# Patient Record
Sex: FEMALE | Race: WHITE | URBAN - NONMETROPOLITAN AREA
[De-identification: names, ages, dates, MRNs, and addresses within clinical notes are randomized per-mention and may not be internally consistent; named-entity substitution may affect disease eponyms.]

---

## 2024-11-20 ENCOUNTER — HOSPITAL ENCOUNTER (OUTPATIENT)
Age: 34
Setting detail: SPECIMEN
Discharge: HOME | End: 2024-11-20
Payer: MEDICAID

## 2024-11-20 ENCOUNTER — INITIAL PRENATAL (OUTPATIENT)
Dept: OBGYN | Age: 34
End: 2024-11-20

## 2024-11-20 VITALS
HEIGHT: 67 IN | SYSTOLIC BLOOD PRESSURE: 104 MMHG | WEIGHT: 142.2 LBS | BODY MASS INDEX: 22.32 KG/M2 | DIASTOLIC BLOOD PRESSURE: 68 MMHG

## 2024-11-20 DIAGNOSIS — N91.2 AMENORRHEA: ICD-10-CM

## 2024-11-20 DIAGNOSIS — Z3A.17 17 WEEKS GESTATION OF PREGNANCY: Primary | ICD-10-CM

## 2024-11-20 DIAGNOSIS — Z32.01 POSITIVE URINE PREGNANCY TEST: ICD-10-CM

## 2024-11-20 DIAGNOSIS — Z34.82 ENCOUNTER FOR SUPERVISION OF OTHER NORMAL PREGNANCY IN SECOND TRIMESTER: ICD-10-CM

## 2024-11-20 LAB
ABO + RH BLD: NORMAL
BLOOD GROUP ANTIBODIES SERPL: NEGATIVE
CONTROL: NORMAL
HCV AB SERPL QL IA: NONREACTIVE
PREGNANCY TEST URINE, POC: POSITIVE

## 2024-11-20 PROCEDURE — 86762 RUBELLA ANTIBODY: CPT

## 2024-11-20 PROCEDURE — 85025 COMPLETE CBC W/AUTO DIFF WBC: CPT

## 2024-11-20 PROCEDURE — 87389 HIV-1 AG W/HIV-1&-2 AB AG IA: CPT

## 2024-11-20 PROCEDURE — 86403 PARTICLE AGGLUT ANTBDY SCRN: CPT

## 2024-11-20 PROCEDURE — 86780 TREPONEMA PALLIDUM: CPT

## 2024-11-20 PROCEDURE — 87491 CHLMYD TRACH DNA AMP PROBE: CPT

## 2024-11-20 PROCEDURE — 86901 BLOOD TYPING SEROLOGIC RH(D): CPT

## 2024-11-20 PROCEDURE — 87086 URINE CULTURE/COLONY COUNT: CPT

## 2024-11-20 PROCEDURE — 87340 HEPATITIS B SURFACE AG IA: CPT

## 2024-11-20 PROCEDURE — 86803 HEPATITIS C AB TEST: CPT

## 2024-11-20 PROCEDURE — 86900 BLOOD TYPING SEROLOGIC ABO: CPT

## 2024-11-20 PROCEDURE — 87591 N.GONORRHOEAE DNA AMP PROB: CPT

## 2024-11-20 PROCEDURE — 86850 RBC ANTIBODY SCREEN: CPT

## 2024-11-20 NOTE — PROGRESS NOTES
New OB Visit    Date of service: 2024    Cammie Goldberg  Is a 34 y.o.  female presenting for a New OB visit with Nurse. Name of Father of Baby is Shola Goldberg and is involved. Pt does work at self employed.    Pt is not Fertility pt..    PT's PCP is: Philip Baig MD     : 1990                                             Subjective:        No LMP recorded.   OB History   No obstetric history on file.           Social History     Tobacco Use   Smoking Status Never   Smokeless Tobacco Never        Social History     Substance and Sexual Activity   Alcohol Use Never        Allergies: Amoxicillin, Codeine, Corn-containing products, and Gluten meal    No past medical history on file.    No past surgical history on file.      Current Outpatient Medications:     lidocaine viscous (XYLOCAINE) 2 % solution, Take 15 mLs by mouth every 3 hours as needed for Irritation, Disp: 120 mL, Rfl: 0      Vital Signs There were no vitals taken for this visit.      No results found for this visit on 24.      Pain: none                            Nausea: yes      Vomiting: no        Breast enlargement or tenderness: yes    Frequency of urination:yes      Fatigue: yes         Patient history reviewed. Educational materials given and genetic testing reviewed.     OB Genetic Screening    Patient's Age 35+ at Date of Delivery      Cystic Fibrosis      Thalassemia MCV<80      Dionisio Chorea      Neural Tube Defect      Mental Retardation/Autism      Congenital Heart Defect      Was Person Treated for Fragilex?      Down Syndrome      Other Inherited Genetic Chromosomal Disorder?      Eyal-Sachs      Maternal Metabolic Disorder      Canavan Disease      Patient or Baby's Father Had Other Defects?      Familial dysautonomia      Recurrent Pregnancy Loss or Still Birth?      Sickle Cell Disease or Trait      Medications (including supplements, vitamins, herbs or OTC drugs) / illicit / recreational drugs / alcohol 
feces or litter boxes. Also, wash your hands after you handle raw meat, and fully cook all meat before you eat it. Wear gloves when you work in the yard or garden, and wash your hands well when you are done. Cat feces, raw or undercooked meat, and contaminated dirt can cause an infection that may harm your baby or lead to a miscarriage.     Avoid things that can make your body too hot and may be harmful to your baby, such as a hot tub or sauna. Or talk with your doctor before doing anything that raises your body temperature. Your doctor can tell you if it's safe.     Avoid chemical fumes, paint fumes, or poisons.     Do not use illegal drugs, marijuana, or alcohol.   Medicines    Review all of your medicines with your doctor. Some of your routine medicines may need to be changed to protect your baby.     Use acetaminophen (Tylenol) to relieve minor problems, such as a mild headache or backache or a mild fever with cold symptoms. Do not use nonsteroidal anti-inflammatory drugs (NSAIDs), such as ibuprofen (Advil, Motrin) or naproxen (Aleve), unless your doctor says it is okay.     Do not take two or more pain medicines at the same time unless the doctor told you to. Many pain medicines have acetaminophen, which is Tylenol. Too much acetaminophen (Tylenol) can be harmful.     Take your medicines exactly as prescribed. Call your doctor if you think you are having a problem with your medicine.   To manage morning sickness    Keep food in your stomach, but not too much at once. Try eating five or six small meals a day instead of three large meals.     For nausea when you wake up, eat a small snack, such as a couple of crackers or pretzels, before rising. Allow a few minutes for your stomach to settle before you slowly get up.     Try to avoid smells and foods that make you feel nauseated. High-fat or greasy foods, milk, and coffee may make nausea worse. Some foods that may be easier to tolerate include cold, spicy, sour,

## 2024-11-21 LAB
BASOPHILS # BLD: 0.03 K/UL (ref 0–0.2)
BASOPHILS NFR BLD: 0 % (ref 0–2)
CHLAMYDIA DNA UR QL NAA+PROBE: NEGATIVE
EOSINOPHIL # BLD: 0.12 K/UL (ref 0–0.44)
EOSINOPHILS RELATIVE PERCENT: 1 % (ref 1–4)
ERYTHROCYTE [DISTWIDTH] IN BLOOD BY AUTOMATED COUNT: 14 % (ref 11.8–14.4)
HBV SURFACE AG SERPL QL IA: NONREACTIVE
HCT VFR BLD AUTO: 34.4 % (ref 36.3–47.1)
HGB BLD-MCNC: 11.4 G/DL (ref 11.9–15.1)
HIV 1+2 AB+HIV1 P24 AG SERPL QL IA: NONREACTIVE
IMM GRANULOCYTES # BLD AUTO: 0.05 K/UL (ref 0–0.3)
IMM GRANULOCYTES NFR BLD: 1 %
LYMPHOCYTES NFR BLD: 1.32 K/UL (ref 1.1–3.7)
LYMPHOCYTES RELATIVE PERCENT: 13 % (ref 24–43)
MCH RBC QN AUTO: 30.6 PG (ref 25.2–33.5)
MCHC RBC AUTO-ENTMCNC: 33.1 G/DL (ref 28.4–34.8)
MCV RBC AUTO: 92.5 FL (ref 82.6–102.9)
MICROORGANISM SPEC CULT: ABNORMAL
MONOCYTES NFR BLD: 0.87 K/UL (ref 0.1–1.2)
MONOCYTES NFR BLD: 9 % (ref 3–12)
N GONORRHOEA DNA UR QL NAA+PROBE: NEGATIVE
NEUTROPHILS NFR BLD: 76 % (ref 36–65)
NEUTS SEG NFR BLD: 7.69 K/UL (ref 1.5–8.1)
NRBC BLD-RTO: 0 PER 100 WBC
PLATELET # BLD AUTO: 253 K/UL (ref 138–453)
PMV BLD AUTO: 10.1 FL (ref 8.1–13.5)
RBC # BLD AUTO: 3.72 M/UL (ref 3.95–5.11)
RUBV IGG SERPL QL IA: 32.2 IU/ML
SERVICE CMNT-IMP: ABNORMAL
SPECIMEN DESCRIPTION: ABNORMAL
SPECIMEN DESCRIPTION: NORMAL
T PALLIDUM AB SER QL IA: NONREACTIVE
WBC OTHER # BLD: 10.1 K/UL (ref 3.5–11.3)

## 2024-11-25 DIAGNOSIS — O23.42 GBS (GROUP B STREPTOCOCCUS) UTI COMPLICATING PREGNANCY, SECOND TRIMESTER: Primary | ICD-10-CM

## 2024-11-25 DIAGNOSIS — B95.1 GBS (GROUP B STREPTOCOCCUS) UTI COMPLICATING PREGNANCY, SECOND TRIMESTER: Primary | ICD-10-CM

## 2024-11-25 RX ORDER — NITROFURANTOIN 25; 75 MG/1; MG/1
100 CAPSULE ORAL 2 TIMES DAILY
Qty: 20 CAPSULE | Refills: 0 | Status: SHIPPED | OUTPATIENT
Start: 2024-11-25 | End: 2024-12-05

## 2024-11-25 RX ORDER — CEPHALEXIN 500 MG/1
500 CAPSULE ORAL 2 TIMES DAILY
Qty: 14 CAPSULE | Refills: 0 | Status: CANCELLED | OUTPATIENT
Start: 2024-11-25 | End: 2024-12-02

## 2024-11-25 NOTE — TELEPHONE ENCOUNTER
This is a GBS positive in urine on a pregnant patient can we see if they can run a culture and sensitivity

## 2024-12-10 SDOH — ECONOMIC STABILITY: FOOD INSECURITY: WITHIN THE PAST 12 MONTHS, THE FOOD YOU BOUGHT JUST DIDN'T LAST AND YOU DIDN'T HAVE MONEY TO GET MORE.: SOMETIMES TRUE

## 2024-12-10 SDOH — ECONOMIC STABILITY: FOOD INSECURITY: WITHIN THE PAST 12 MONTHS, YOU WORRIED THAT YOUR FOOD WOULD RUN OUT BEFORE YOU GOT MONEY TO BUY MORE.: SOMETIMES TRUE

## 2024-12-10 SDOH — ECONOMIC STABILITY: TRANSPORTATION INSECURITY
IN THE PAST 12 MONTHS, HAS LACK OF TRANSPORTATION KEPT YOU FROM MEETINGS, WORK, OR FROM GETTING THINGS NEEDED FOR DAILY LIVING?: NO

## 2024-12-10 SDOH — ECONOMIC STABILITY: INCOME INSECURITY: HOW HARD IS IT FOR YOU TO PAY FOR THE VERY BASICS LIKE FOOD, HOUSING, MEDICAL CARE, AND HEATING?: SOMEWHAT HARD

## 2024-12-11 ENCOUNTER — TELEPHONE (OUTPATIENT)
Dept: OBGYN | Age: 34
End: 2024-12-11

## 2024-12-11 ENCOUNTER — HOSPITAL ENCOUNTER (OUTPATIENT)
Age: 34
Setting detail: SPECIMEN
Discharge: HOME | End: 2024-12-11
Payer: MEDICAID

## 2024-12-11 ENCOUNTER — ROUTINE PRENATAL (OUTPATIENT)
Dept: OBGYN | Age: 34
End: 2024-12-11
Payer: MEDICAID

## 2024-12-11 VITALS
SYSTOLIC BLOOD PRESSURE: 114 MMHG | HEART RATE: 82 BPM | WEIGHT: 148 LBS | DIASTOLIC BLOOD PRESSURE: 62 MMHG | BODY MASS INDEX: 23.18 KG/M2

## 2024-12-11 DIAGNOSIS — Z12.4 SCREENING FOR MALIGNANT NEOPLASM OF CERVIX: ICD-10-CM

## 2024-12-11 DIAGNOSIS — N84.1 ENDOCERVICAL POLYP: ICD-10-CM

## 2024-12-11 DIAGNOSIS — Z34.82 ENCOUNTER FOR SUPERVISION OF OTHER NORMAL PREGNANCY IN SECOND TRIMESTER: Primary | ICD-10-CM

## 2024-12-11 DIAGNOSIS — O35.CXX1: ICD-10-CM

## 2024-12-11 DIAGNOSIS — Z3A.20 20 WEEKS GESTATION OF PREGNANCY: ICD-10-CM

## 2024-12-11 PROCEDURE — 99214 OFFICE O/P EST MOD 30 MIN: CPT | Performed by: ADVANCED PRACTICE MIDWIFE

## 2024-12-11 PROCEDURE — G0145 SCR C/V CYTO,THINLAYER,RESCR: HCPCS

## 2024-12-11 PROCEDURE — 87624 HPV HI-RISK TYP POOLED RSLT: CPT

## 2024-12-11 NOTE — TELEPHONE ENCOUNTER
Please follow referral to [facility] for fetal ultrasound with pulmonary edema and incomplete heart views

## 2024-12-12 ENCOUNTER — TELEPHONE (OUTPATIENT)
Dept: OBGYN | Age: 34
End: 2024-12-12

## 2024-12-12 DIAGNOSIS — O35.CXX1: Primary | ICD-10-CM

## 2024-12-12 NOTE — TELEPHONE ENCOUNTER
Pt LM that she has an appt at [facility] on 1/22/2025 for US and consult. Pt wants to know if we could refer her somewhere else that might be able to get her in sooner.

## 2024-12-12 NOTE — PROGRESS NOTES
Cammie Goldberg is here at 20w4d for:    Chief Complaint   Patient presents with    Routine Prenatal Visit     F/U in house 20 week u/s. TBE, patient has never had pap.        Estimated Due Date: Estimated Date of Delivery: 25    OB History    Para Term  AB Living   1             SAB IAB Ectopic Molar Multiple Live Births                    # Outcome Date GA Lbr Akhil/2nd Weight Sex Type Anes PTL Lv   1 Current                 History reviewed. No pertinent past medical history.    History reviewed. No pertinent surgical history.    Social History     Tobacco Use   Smoking Status Never   Smokeless Tobacco Never        Social History     Substance and Sexual Activity   Alcohol Use Never               HPI: here for routine ob visit with TBE and anatomy scan     PT denies fever, chills, nausea and vomiting       Vitals:  Estimated body mass index is 23.18 kg/m² as calculated from the following:    Height as of 24: 1.702 m (5' 7\").    Weight as of this encounter: 67.1 kg (148 lb).  BP: 114/62  Weight - Scale: 67.1 kg (148 lb)  Pulse: 82  Patient Position: Sitting  Fundal Height (cm): 20 cm  Fetal HR: 155  Movement: Present           Abdomen: round, soft, nontender  Total body exam completed please see prenatal physical exam tab in visit navigator       Results reviewed today:  20.3 WK IUP  CL:4.5 cm  HR:153 bpm  Anterior placenta, breech presentation  Ovaries: rt-not seen, lt-seen, wnl.  Gender: female  Active fetal movements  All visualized fetal anatomy WNL.  Heart views suboptimal d/t fetal position.   Possible area of fluid visualized in fetal chest.   No results found for this visit on 24.        ASSESSMENT & Plan    Diagnosis Orders   1. Encounter for supervision of other normal pregnancy in second trimester        2. 20 weeks gestation of pregnancy        3. Maternal care for other (suspected) fetal abnormality and damage, fetal pulmonary anomalies, fetus 1  Dino Vega,

## 2024-12-13 ENCOUNTER — TELEPHONE (OUTPATIENT)
Dept: OBGYN | Age: 34
End: 2024-12-13

## 2024-12-13 LAB
HPV I/H RISK 4 DNA CVX QL NAA+PROBE: NOT DETECTED
HPV SAMPLE: NORMAL
HPV, INTERPRETATION: NORMAL
HPV16 DNA CVX QL NAA+PROBE: NOT DETECTED
HPV18 DNA CVX QL NAA+PROBE: NOT DETECTED
SPECIMEN DESCRIPTION: NORMAL

## 2024-12-13 NOTE — TELEPHONE ENCOUNTER
Writer called [facility] this morning to confirm they had received Miss Goldberg's referral. Writer spoke with Angela, she stated they received it and would get her in next week. About 10 am Friday 12.13.2024, NATTY called back and stated they do not except her insurance, so they will not be able to see her.     Please advise

## 2024-12-16 DIAGNOSIS — O35.CXX1: Primary | ICD-10-CM

## 2024-12-17 NOTE — TELEPHONE ENCOUNTER
Attempted to fax multiple times, back with communication errors. Faxed to an alternate fax number. Awaiting response,

## 2024-12-18 NOTE — TELEPHONE ENCOUNTER
Faxed referral, with fax confirmation received. Called pt and pt stated she has an appt on12/19/2024

## 2024-12-20 LAB — CYTOLOGY REPORT: NORMAL

## 2025-01-06 ENCOUNTER — ROUTINE PRENATAL (OUTPATIENT)
Dept: OBGYN | Age: 35
End: 2025-01-06
Payer: MEDICAID

## 2025-01-06 VITALS
BODY MASS INDEX: 24.12 KG/M2 | SYSTOLIC BLOOD PRESSURE: 116 MMHG | HEART RATE: 60 BPM | DIASTOLIC BLOOD PRESSURE: 60 MMHG | WEIGHT: 154 LBS

## 2025-01-06 DIAGNOSIS — Z34.02 ENCOUNTER FOR SUPERVISION OF NORMAL FIRST PREGNANCY IN SECOND TRIMESTER: Primary | ICD-10-CM

## 2025-01-06 DIAGNOSIS — Z3A.24 24 WEEKS GESTATION OF PREGNANCY: ICD-10-CM

## 2025-01-06 PROCEDURE — 99213 OFFICE O/P EST LOW 20 MIN: CPT | Performed by: ADVANCED PRACTICE MIDWIFE

## 2025-01-06 NOTE — PROGRESS NOTES
Cammie Goldberg is here at 24w2d for:    Chief Complaint   Patient presents with    Routine Prenatal Visit     Instructions for 1 hour GTT. At 28 weeks.  C/O heartburn, using elena.        Estimated Due Date: Estimated Date of Delivery: 25    OB History    Para Term  AB Living   1             SAB IAB Ectopic Molar Multiple Live Births                    # Outcome Date GA Lbr Akhil/2nd Weight Sex Type Anes PTL Lv   1 Current                 No past medical history on file.    No past surgical history on file.    Social History     Tobacco Use   Smoking Status Never   Smokeless Tobacco Never        Social History     Substance and Sexual Activity   Alcohol Use Never               HPI: here for routine ob visit, is gluten and corn intolerant, can't take glucola     PT denies fever, chills, nausea and vomiting       Vitals:  Estimated body mass index is 24.12 kg/m² as calculated from the following:    Height as of 24: 1.702 m (5' 7\").    Weight as of this encounter: 69.9 kg (154 lb).  BP: 116/60  Weight - Scale: 69.9 kg (154 lb)  Pulse: 60  Patient Position: Sitting  Fundal Height (cm): 25 cm  Fetal HR: 136  Movement: Present           Abdomen: gravid,soft, nontender    Results reviewed today:    No results found for this visit on 25.        ASSESSMENT & Plan    Diagnosis Orders   1. Encounter for supervision of normal first pregnancy in second trimester        2. 24 weeks gestation of pregnancy            see mfm reports with no fluid noted in their sono around pleural cavity or pericardiem      I am having Cammie Goldberg maintain her Multiple Vitamins-Minerals (MULTIVITAMIN ADULTS PO) and Prenatal MV-Min-Fe Fum-FA-DHA (PRENATAL 1 PO).    Return in about 4 weeks (around 2/3/2025) for ob, hgb, hgb a1c and fasting glucose.    There are no Patient Instructions on file for this visit.             EVAN Raines - SUSAN,2025 11:38 AM                non-verbal

## 2025-02-03 ENCOUNTER — HOSPITAL ENCOUNTER (OUTPATIENT)
Age: 35
Setting detail: SPECIMEN
Discharge: HOME | End: 2025-02-03
Payer: MEDICAID

## 2025-02-03 ENCOUNTER — ROUTINE PRENATAL (OUTPATIENT)
Dept: OBGYN | Age: 35
End: 2025-02-03
Payer: MEDICAID

## 2025-02-03 VITALS
BODY MASS INDEX: 25.69 KG/M2 | HEART RATE: 79 BPM | WEIGHT: 164 LBS | SYSTOLIC BLOOD PRESSURE: 112 MMHG | DIASTOLIC BLOOD PRESSURE: 68 MMHG

## 2025-02-03 DIAGNOSIS — R30.0 DYSURIA: ICD-10-CM

## 2025-02-03 DIAGNOSIS — Z3A.28 28 WEEKS GESTATION OF PREGNANCY: Primary | ICD-10-CM

## 2025-02-03 DIAGNOSIS — Z3A.28 28 WEEKS GESTATION OF PREGNANCY: ICD-10-CM

## 2025-02-03 DIAGNOSIS — Z34.03 ENCOUNTER FOR SUPERVISION OF NORMAL FIRST PREGNANCY IN THIRD TRIMESTER: ICD-10-CM

## 2025-02-03 LAB
EST. AVERAGE GLUCOSE BLD GHB EST-MCNC: 94 MG/DL
GLUCOSE P FAST SERPL-MCNC: 75 MG/DL (ref 74–99)
HBA1C MFR BLD: 4.9 % (ref 4–6)
HGB BLD-MCNC: 11.1 G/DL (ref 11.9–15.1)

## 2025-02-03 PROCEDURE — 99213 OFFICE O/P EST LOW 20 MIN: CPT | Performed by: ADVANCED PRACTICE MIDWIFE

## 2025-02-03 PROCEDURE — 82947 ASSAY GLUCOSE BLOOD QUANT: CPT

## 2025-02-03 PROCEDURE — 87086 URINE CULTURE/COLONY COUNT: CPT

## 2025-02-03 PROCEDURE — 85018 HEMOGLOBIN: CPT

## 2025-02-03 PROCEDURE — 36415 COLL VENOUS BLD VENIPUNCTURE: CPT | Performed by: ADVANCED PRACTICE MIDWIFE

## 2025-02-03 PROCEDURE — 87186 SC STD MICRODIL/AGAR DIL: CPT

## 2025-02-03 PROCEDURE — 83036 HEMOGLOBIN GLYCOSYLATED A1C: CPT

## 2025-02-03 PROCEDURE — 86403 PARTICLE AGGLUT ANTBDY SCRN: CPT

## 2025-02-03 SDOH — ECONOMIC STABILITY: FOOD INSECURITY: WITHIN THE PAST 12 MONTHS, THE FOOD YOU BOUGHT JUST DIDN'T LAST AND YOU DIDN'T HAVE MONEY TO GET MORE.: SOMETIMES TRUE

## 2025-02-03 SDOH — ECONOMIC STABILITY: INCOME INSECURITY: IN THE LAST 12 MONTHS, WAS THERE A TIME WHEN YOU WERE NOT ABLE TO PAY THE MORTGAGE OR RENT ON TIME?: NO

## 2025-02-03 SDOH — ECONOMIC STABILITY: FOOD INSECURITY: WITHIN THE PAST 12 MONTHS, YOU WORRIED THAT YOUR FOOD WOULD RUN OUT BEFORE YOU GOT MONEY TO BUY MORE.: SOMETIMES TRUE

## 2025-02-03 SDOH — ECONOMIC STABILITY: TRANSPORTATION INSECURITY
IN THE PAST 12 MONTHS, HAS THE LACK OF TRANSPORTATION KEPT YOU FROM MEDICAL APPOINTMENTS OR FROM GETTING MEDICATIONS?: NO

## 2025-02-03 NOTE — PROGRESS NOTES
Cammie Goldberg is here at 28w2d for:    Chief Complaint   Patient presents with    Routine Prenatal Visit     Feeling good. Hemoglobin, HCG A1C and fasting glucose drawn.        Estimated Due Date: Estimated Date of Delivery: 25    OB History    Para Term  AB Living   1             SAB IAB Ectopic Molar Multiple Live Births                    # Outcome Date GA Lbr Akhil/2nd Weight Sex Type Anes PTL Lv   1 Current                 No past medical history on file.    No past surgical history on file.    Social History     Tobacco Use   Smoking Status Never   Smokeless Tobacco Never        Social History     Substance and Sexual Activity   Alcohol Use Never               HPI: here for routine ob visit at 28 weeks;  patient has IBS/ corn intolerant; cannot drink glucola;      PT denies fever, chills, nausea and vomiting       Vitals:  Estimated body mass index is 25.69 kg/m² as calculated from the following:    Height as of 24: 1.702 m (5' 7\").    Weight as of this encounter: 74.4 kg (164 lb).  BP: 112/68  Weight - Scale: 74.4 kg (164 lb)  Pulse: 79  Patient Position: Sitting  Albumin: Negative  Glucose: Negative  Fundal Height (cm): 28 cm  Fetal HR: 145  Movement: Present           Abdomen: gravid,soft, nontender    Results reviewed today:    No results found for this visit on 25.        ASSESSMENT & Plan    Diagnosis Orders   1. 28 weeks gestation of pregnancy  Glucose, Fasting    Hemoglobin A1C    Hemoglobin      2. Encounter for supervision of normal first pregnancy in third trimester        3. Dysuria  Culture, Urine                I am having Cammie Goldberg maintain her Prenatal MV-Min-Fe Fum-FA-DHA (PRENATAL 1 PO).    Return in about 2 weeks (around 2025) for ob 30wkUS+tdap.    There are no Patient Instructions on file for this visit.             Anamaria Trinidad, EVAN - SUSAN,2/3/2025 10:32 AM

## 2025-02-05 LAB
MICROORGANISM SPEC CULT: ABNORMAL
SERVICE CMNT-IMP: ABNORMAL
SPECIMEN DESCRIPTION: ABNORMAL

## 2025-02-05 RX ORDER — CEPHALEXIN 500 MG/1
500 CAPSULE ORAL 3 TIMES DAILY
Qty: 21 CAPSULE | Refills: 0 | Status: SHIPPED | OUTPATIENT
Start: 2025-02-05 | End: 2025-02-12

## 2025-02-05 NOTE — RESULT ENCOUNTER NOTE
Pt. notified of results and voices understanding. Patient states she has never taken Keflex in  the past.

## 2025-02-16 SDOH — ECONOMIC STABILITY: INCOME INSECURITY: IN THE LAST 12 MONTHS, WAS THERE A TIME WHEN YOU WERE NOT ABLE TO PAY THE MORTGAGE OR RENT ON TIME?: NO

## 2025-02-16 SDOH — ECONOMIC STABILITY: FOOD INSECURITY: WITHIN THE PAST 12 MONTHS, THE FOOD YOU BOUGHT JUST DIDN'T LAST AND YOU DIDN'T HAVE MONEY TO GET MORE.: SOMETIMES TRUE

## 2025-02-16 SDOH — ECONOMIC STABILITY: FOOD INSECURITY: WITHIN THE PAST 12 MONTHS, YOU WORRIED THAT YOUR FOOD WOULD RUN OUT BEFORE YOU GOT MONEY TO BUY MORE.: SOMETIMES TRUE

## 2025-02-17 ENCOUNTER — ROUTINE PRENATAL (OUTPATIENT)
Dept: OBGYN | Age: 35
End: 2025-02-17
Payer: MEDICAID

## 2025-02-17 VITALS — BODY MASS INDEX: 26.03 KG/M2 | SYSTOLIC BLOOD PRESSURE: 104 MMHG | WEIGHT: 166.2 LBS | DIASTOLIC BLOOD PRESSURE: 62 MMHG

## 2025-02-17 DIAGNOSIS — Z3A.30 30 WEEKS GESTATION OF PREGNANCY: ICD-10-CM

## 2025-02-17 DIAGNOSIS — O09.513 AMA (ADVANCED MATERNAL AGE) PRIMIGRAVIDA 35+, THIRD TRIMESTER: ICD-10-CM

## 2025-02-17 DIAGNOSIS — Z34.03 ENCOUNTER FOR SUPERVISION OF NORMAL FIRST PREGNANCY IN THIRD TRIMESTER: Primary | ICD-10-CM

## 2025-02-17 PROCEDURE — 99213 OFFICE O/P EST LOW 20 MIN: CPT | Performed by: ADVANCED PRACTICE MIDWIFE

## 2025-02-17 NOTE — PROGRESS NOTES
Cammiedick Goldberg is here at 30w2d for:    Chief Complaint   Patient presents with    Routine Prenatal Visit     Follow up in house usn. Declines Tdap. Voices no concerns. Intermittent shortness of breath       Estimated Due Date: Estimated Date of Delivery: 25    OB History    Para Term  AB Living   1             SAB IAB Ectopic Molar Multiple Live Births                    # Outcome Date GA Lbr Akhil/2nd Weight Sex Type Anes PTL Lv   1 Current                 No past medical history on file.    No past surgical history on file.    Social History     Tobacco Use   Smoking Status Never   Smokeless Tobacco Never        Social History     Substance and Sexual Activity   Alcohol Use Never               HPI: here for routine ob visit, c/o intermittant shortness of breast     PT denies fever, chills, nausea and vomiting     Denies cough or wheezing    Vitals:  Estimated body mass index is 26.03 kg/m² as calculated from the following:    Height as of 24: 1.702 m (5' 7\").    Weight as of this encounter: 75.4 kg (166 lb 3.2 oz).  BP: 104/62  Weight - Scale: 75.4 kg (166 lb 3.2 oz)  Patient Position: Sitting  Albumin: Negative  Glucose: Negative  Fundal Height (cm): 30 cm  Fetal HR: 141  Movement: Present  Presentation: Vertex           Abdomen: gravid, soft, nontender    Results reviewed today:  30.1 WK IUP  EFW:47.9% (3lb 6oz)  CL:3.6 cm  NERY:11.4 cm  HR:161 bpm  Anterior placenta, cephalic presentation  Active fetal movements.  Marginal cord insertion.  No results found for this visit on 25.        ASSESSMENT & Plan   Assessment & Plan  Encounter for supervision of normal first pregnancy in third trimester   Monitored by specialist- no acute findings meriting change in the plan         30 weeks gestation of pregnancy   Monitored by specialist- no acute findings meriting change in the plan         AMA (advanced maternal age) primigravida 35+, third trimester   Chronic, at goal (stable),

## 2025-03-05 ENCOUNTER — ROUTINE PRENATAL (OUTPATIENT)
Dept: OBGYN | Age: 35
End: 2025-03-05
Payer: MEDICAID

## 2025-03-05 VITALS
BODY MASS INDEX: 26.59 KG/M2 | DIASTOLIC BLOOD PRESSURE: 62 MMHG | HEART RATE: 84 BPM | SYSTOLIC BLOOD PRESSURE: 116 MMHG | WEIGHT: 169.8 LBS

## 2025-03-05 DIAGNOSIS — Z34.03 ENCOUNTER FOR SUPERVISION OF NORMAL FIRST PREGNANCY IN THIRD TRIMESTER: Primary | ICD-10-CM

## 2025-03-05 DIAGNOSIS — Z3A.32 32 WEEKS GESTATION OF PREGNANCY: ICD-10-CM

## 2025-03-05 PROCEDURE — 99213 OFFICE O/P EST LOW 20 MIN: CPT | Performed by: ADVANCED PRACTICE MIDWIFE

## 2025-03-05 NOTE — PROGRESS NOTES
Cammie Goldberg is here at 32w4d for:    Chief Complaint   Patient presents with    Routine Prenatal Visit     C/O low back pain.        Estimated Due Date: Estimated Date of Delivery: 25    OB History    Para Term  AB Living   1             SAB IAB Ectopic Molar Multiple Live Births                    # Outcome Date GA Lbr Akhil/2nd Weight Sex Type Anes PTL Lv   1 Current                 No past medical history on file.    No past surgical history on file.    Social History     Tobacco Use   Smoking Status Never   Smokeless Tobacco Never        Social History     Substance and Sexual Activity   Alcohol Use Never               HPI: here for routine ob visit, c/o fatigue low back and pelvic \"soreness\"     PT denies fever, chills, nausea and vomiting       Vitals:  Estimated body mass index is 26.59 kg/m² as calculated from the following:    Height as of 24: 1.702 m (5' 7\").    Weight as of this encounter: 77 kg (169 lb 12.8 oz).  BP: 116/62  Weight - Scale: 77 kg (169 lb 12.8 oz)  Pulse: 84  Patient Position: Sitting  Albumin: Negative  Glucose: Negative  Movement: Present           Abdomen: gravid,soft, nontender    Results reviewed today:    No results found for this visit on 25.        ASSESSMENT & Plan   Assessment & Plan  Encounter for supervision of normal first pregnancy in third trimester   Chronic, at goal (stable), continue current treatment plan         32 weeks gestation of pregnancy   Chronic, at goal (stable), continue current treatment plan         Will trial prenatal cradle / minicradle        I am having Cammie Goldberg maintain her Prenatal MV-Min-Fe Fum-FA-DHA (PRENATAL 1 PO).    Return in about 2 weeks (around 3/19/2025) for ob.    There are no Patient Instructions on file for this visit.             EVAN Raines - SUSAN,3/5/2025 3:10 PM

## 2025-03-17 ENCOUNTER — HOSPITAL ENCOUNTER (OUTPATIENT)
Age: 35
Setting detail: SPECIMEN
Discharge: HOME | End: 2025-03-17
Payer: MEDICAID

## 2025-03-17 ENCOUNTER — ROUTINE PRENATAL (OUTPATIENT)
Dept: OBGYN | Age: 35
End: 2025-03-17
Payer: MEDICAID

## 2025-03-17 VITALS
BODY MASS INDEX: 27.16 KG/M2 | SYSTOLIC BLOOD PRESSURE: 112 MMHG | WEIGHT: 173.4 LBS | DIASTOLIC BLOOD PRESSURE: 68 MMHG | HEART RATE: 85 BPM

## 2025-03-17 DIAGNOSIS — Z3A.34 34 WEEKS GESTATION OF PREGNANCY: ICD-10-CM

## 2025-03-17 DIAGNOSIS — Z34.03 ENCOUNTER FOR SUPERVISION OF NORMAL FIRST PREGNANCY IN THIRD TRIMESTER: Primary | ICD-10-CM

## 2025-03-17 DIAGNOSIS — N93.9 VAGINAL SPOTTING: ICD-10-CM

## 2025-03-17 LAB
BACTERIA URNS QL MICRO: ABNORMAL
BILIRUB UR QL STRIP: NEGATIVE
CLARITY UR: CLEAR
COLOR UR: YELLOW
EPI CELLS #/AREA URNS HPF: ABNORMAL /HPF (ref 0–25)
GLUCOSE UR STRIP-MCNC: NEGATIVE MG/DL
HGB UR QL STRIP.AUTO: ABNORMAL
KETONES UR STRIP-MCNC: NEGATIVE MG/DL
LEUKOCYTE ESTERASE UR QL STRIP: ABNORMAL
MUCOUS THREADS URNS QL MICRO: ABNORMAL
NITRITE UR QL STRIP: NEGATIVE
PH UR STRIP: 6.5 [PH] (ref 5–9)
PROT UR STRIP-MCNC: NEGATIVE MG/DL
RBC #/AREA URNS HPF: ABNORMAL /HPF (ref 0–2)
SP GR UR STRIP: 1.02 (ref 1.01–1.02)
UROBILINOGEN UR STRIP-ACNC: NORMAL EU/DL (ref 0–1)
WBC #/AREA URNS HPF: ABNORMAL /HPF (ref 0–5)

## 2025-03-17 PROCEDURE — 99213 OFFICE O/P EST LOW 20 MIN: CPT | Performed by: ADVANCED PRACTICE MIDWIFE

## 2025-03-17 PROCEDURE — 87086 URINE CULTURE/COLONY COUNT: CPT

## 2025-03-17 PROCEDURE — 81001 URINALYSIS AUTO W/SCOPE: CPT

## 2025-03-17 PROCEDURE — 86403 PARTICLE AGGLUT ANTBDY SCRN: CPT

## 2025-03-17 NOTE — PROGRESS NOTES
New, not at goal (unstable), continue current plan pending work up below    Orders:    Urinalysis; Future    Culture, Urine; Future            I am having Cammie Goldberg maintain her Prenatal MV-Min-Fe Fum-FA-DHA (PRENATAL 1 PO).    Return in about 2 weeks (around 3/31/2025) for ob no gbs.    There are no Patient Instructions on file for this visit.             EVAN Raines CNM,3/17/2025 2:45 PM

## 2025-03-18 LAB
MICROORGANISM SPEC CULT: ABNORMAL
SERVICE CMNT-IMP: ABNORMAL
SPECIMEN DESCRIPTION: ABNORMAL

## 2025-03-19 ENCOUNTER — RESULTS FOLLOW-UP (OUTPATIENT)
Dept: OBGYN | Age: 35
End: 2025-03-19

## 2025-03-19 RX ORDER — CEPHALEXIN 500 MG/1
500 CAPSULE ORAL 2 TIMES DAILY
Qty: 14 CAPSULE | Refills: 0 | Status: SHIPPED | OUTPATIENT
Start: 2025-03-19 | End: 2025-03-26

## 2025-03-19 NOTE — RESULT ENCOUNTER NOTE
Pt. notified of results and voices understanding. Patient has a lot of \"weird dreams\" with antibiotics. Has previously tolerated Keflex but only twice a day due to the Weird Dreams\".

## 2025-03-27 ENCOUNTER — TELEPHONE (OUTPATIENT)
Dept: OBGYN | Age: 35
End: 2025-03-27

## 2025-03-27 ENCOUNTER — HOSPITAL ENCOUNTER (OUTPATIENT)
Age: 35
Discharge: HOME | End: 2025-03-27
Attending: OBSTETRICS & GYNECOLOGY | Admitting: OBSTETRICS & GYNECOLOGY
Payer: MEDICAID

## 2025-03-27 VITALS
RESPIRATION RATE: 16 BRPM | BODY MASS INDEX: 25.62 KG/M2 | TEMPERATURE: 97.9 F | SYSTOLIC BLOOD PRESSURE: 101 MMHG | HEIGHT: 69 IN | DIASTOLIC BLOOD PRESSURE: 68 MMHG | WEIGHT: 173 LBS | HEART RATE: 84 BPM

## 2025-03-27 PROBLEM — O36.8190 DECREASED FETAL MOVEMENT AFFECTING MANAGEMENT OF MOTHER, ANTEPARTUM: Status: ACTIVE | Noted: 2025-03-27

## 2025-03-27 LAB
BACTERIA URNS QL MICRO: ABNORMAL
BILIRUB UR QL STRIP: NEGATIVE
CLARITY UR: CLEAR
COLOR UR: YELLOW
EPI CELLS #/AREA URNS HPF: ABNORMAL /HPF (ref 0–25)
GLUCOSE UR STRIP-MCNC: NEGATIVE MG/DL
HGB UR QL STRIP.AUTO: NEGATIVE
KETONES UR STRIP-MCNC: NEGATIVE MG/DL
LEUKOCYTE ESTERASE UR QL STRIP: ABNORMAL
MUCOUS THREADS URNS QL MICRO: ABNORMAL
NITRITE UR QL STRIP: NEGATIVE
PH UR STRIP: 6.5 [PH] (ref 5–9)
PROT UR STRIP-MCNC: ABNORMAL MG/DL
RBC #/AREA URNS HPF: ABNORMAL /HPF (ref 0–2)
SP GR UR STRIP: 1.02 (ref 1.01–1.02)
UROBILINOGEN UR STRIP-ACNC: NORMAL EU/DL (ref 0–1)
WBC #/AREA URNS HPF: ABNORMAL /HPF (ref 0–5)

## 2025-03-27 PROCEDURE — 99212 OFFICE O/P EST SF 10 MIN: CPT

## 2025-03-27 PROCEDURE — 87086 URINE CULTURE/COLONY COUNT: CPT

## 2025-03-27 PROCEDURE — 86403 PARTICLE AGGLUT ANTBDY SCRN: CPT

## 2025-03-27 PROCEDURE — 81001 URINALYSIS AUTO W/SCOPE: CPT

## 2025-03-27 PROCEDURE — 59025 FETAL NON-STRESS TEST: CPT

## 2025-03-27 NOTE — DISCHARGE INSTRUCTIONS
OUTPATIENT DISCHARGE  Dr. Carla Kahn [facility]  Dr. Merary Trinidad [facility]  [address]  [address] [phone]   or [employer] [phone]     ACTIVITY LIMITATIONS:  ( x )Up and about as desired and tolerated  (  )Up to bathroom only  (  )Lay on either side  (  )Avoid heavy lifting or exercise  (  )No sex  (  )No nipple stimulation  (  )Complet bedrest  (  )Avoid using stairs  ( x )Increase fluids  **DRINK AT LEAST eight-8oz. Glasses of water daily.**    Call your Doctor if:  ( x )Contractions are every 5 minutes apart (from start of one to the start of the next contraction) lasting 60 seconds for at least 1 hour, strong enough you can not walk or talk through the contraction and regular.  ( x )Bag of water breaks  ( x )Vaginal bleeding  ( x )Unusual pain occurs  ( x )Decreased fetal movement  (  ) labor:  If you have 4 contractions in an hour    Keep your scheduled follow up appointment.  Or call for a follow up on________________________________________________.    IN CASE OF EMERGENCY CONTACT LABOR AND DELIVERY [phone].

## 2025-03-27 NOTE — PROGRESS NOTES
Cammie Goldberg is here in L&D at 35w5d for:decreased fetal movement      Maternal Vitals  Temp: 97.9 °F (36.6 °C)  Temp Source: Oral  BP: 101/68  Pulse: 84  Respirations: 16         Findings  Baseline: 145 BPM  Baseline Classification: Normal  Variability: Moderate  Decelerations: None  Accelerations: Yes  Acoustic Stimulator: No  Fetal Movement: Yes  Multiple Gestation?: No  Uterine contractions/10 min.: 1  Interpretation: Reactive  Interpreted by: ARTURO Sharma RN, YAMILKA Branham RN             NST Time start:  1220    NST Time stop:   1240      NST is reactive. Quality of tracing is satisfactory.

## 2025-03-27 NOTE — PROGRESS NOTES
Patient arrives ambulatory to unit at this time with SO for c/o decreased fetal movement. Pt states she has felt fetal movement but it is decreased form fetus normal. Pt has noticed this the last couple days stating \"she normally goes crazy after I eat breakfast and she hasn't been the past few days.\" Pt instructed to change into a gown and provide urine sample.

## 2025-03-27 NOTE — TELEPHONE ENCOUNTER
Patient calls to report  she has had decreased fetal movement since she started Keflex on 03/19/2025. Patient was being treated for UTI however stopped antibiotic as she had \"werid dreams\" dizzy and fatigue. Patient advised to proceed to L&D for further evaluation, patient voiced understanding.

## 2025-03-27 NOTE — PROGRESS NOTES
Patient placed on the monitor at this time, and instructed on NST. Admission questions asked at this time as well.

## 2025-03-27 NOTE — FLOWSHEET NOTE
RN phones Dr Aguirre at this time with reactive NST following pt concerns of decreased FM and urinary frequency and UA results. Orders to send urine for culture and discharge pt at this time.

## 2025-03-28 LAB
MICROORGANISM SPEC CULT: ABNORMAL
SERVICE CMNT-IMP: ABNORMAL
SPECIMEN DESCRIPTION: ABNORMAL

## 2025-03-29 ENCOUNTER — RESULTS FOLLOW-UP (OUTPATIENT)
Dept: OBGYN | Age: 35
End: 2025-03-29

## 2025-04-01 ENCOUNTER — ROUTINE PRENATAL (OUTPATIENT)
Dept: OBGYN | Age: 35
End: 2025-04-01
Payer: MEDICAID

## 2025-04-01 VITALS
DIASTOLIC BLOOD PRESSURE: 74 MMHG | WEIGHT: 175 LBS | HEART RATE: 85 BPM | BODY MASS INDEX: 25.84 KG/M2 | SYSTOLIC BLOOD PRESSURE: 116 MMHG

## 2025-04-01 DIAGNOSIS — Z3A.36 36 WEEKS GESTATION OF PREGNANCY: Primary | ICD-10-CM

## 2025-04-01 DIAGNOSIS — Z34.03 ENCOUNTER FOR SUPERVISION OF NORMAL FIRST PREGNANCY IN THIRD TRIMESTER: ICD-10-CM

## 2025-04-01 PROCEDURE — 99213 OFFICE O/P EST LOW 20 MIN: CPT | Performed by: ADVANCED PRACTICE MIDWIFE

## 2025-04-01 ASSESSMENT — PATIENT HEALTH QUESTIONNAIRE - PHQ9
SUM OF ALL RESPONSES TO PHQ QUESTIONS 1-9: 0
1. LITTLE INTEREST OR PLEASURE IN DOING THINGS: NOT AT ALL
SUM OF ALL RESPONSES TO PHQ QUESTIONS 1-9: 0
2. FEELING DOWN, DEPRESSED OR HOPELESS: NOT AT ALL

## 2025-04-01 NOTE — PROGRESS NOTES
Cammie Goldberg is here at 36w3d for:    Chief Complaint   Patient presents with    Routine Prenatal Visit     36 wk ob. Pt states she was in labor and delivery last week for decrease fetal movement.        Estimated Due Date: Estimated Date of Delivery: 25    OB History    Para Term  AB Living   1        SAB IAB Ectopic Molar Multiple Live Births              # Outcome Date GA Lbr Akhil/2nd Weight Sex Type Anes PTL Lv   1 Current                 History reviewed. No pertinent past medical history.    History reviewed. No pertinent surgical history.    Social History     Tobacco Use   Smoking Status Never   Smokeless Tobacco Never        Social History     Substance and Sexual Activity   Alcohol Use Never               HPI: here for routine ob visit, intermittant contractions reported, does feel baby each day but \"not as big of movements\"     PT denies fever, chills, nausea and vomiting       Vitals:  Estimated body mass index is 25.84 kg/m² as calculated from the following:    Height as of 3/27/25: 1.753 m (5' 9\").    Weight as of this encounter: 79.4 kg (175 lb).  BP: 116/74  Weight - Scale: 79.4 kg (175 lb)  Pulse: 85  Patient Position: Sitting  Albumin: Negative  Glucose: Negative  Fundal Height (cm): 37 cm  Fetal HR: 145  Movement: Present  Presentation: Vertex  Dilation (cm): Closed  Station: Floating           Abdomen: gravid,soft, nontender    Results reviewed today:    No results found for this visit on 25.        ASSESSMENT & Plan   Assessment & Plan  36 weeks gestation of pregnancy   Chronic, at goal (stable), continue current treatment plan         Encounter for supervision of normal first pregnancy in third trimester   Chronic, at goal (stable), continue current treatment plan                 I am having Cammie Goldberg maintain her Prenatal MV-Min-Fe Fum-FA-DHA (PRENATAL 1 PO).    Return in about 1 week (around 2025) for ob.    There are no Patient Instructions on file

## 2025-04-07 ENCOUNTER — ROUTINE PRENATAL (OUTPATIENT)
Dept: OBGYN | Age: 35
End: 2025-04-07
Payer: MEDICAID

## 2025-04-07 VITALS
WEIGHT: 178.8 LBS | SYSTOLIC BLOOD PRESSURE: 112 MMHG | DIASTOLIC BLOOD PRESSURE: 70 MMHG | HEART RATE: 87 BPM | BODY MASS INDEX: 26.4 KG/M2

## 2025-04-07 DIAGNOSIS — Z34.03 ENCOUNTER FOR SUPERVISION OF NORMAL FIRST PREGNANCY IN THIRD TRIMESTER: ICD-10-CM

## 2025-04-07 DIAGNOSIS — Z3A.37 37 WEEKS GESTATION OF PREGNANCY: Primary | ICD-10-CM

## 2025-04-07 PROCEDURE — 99213 OFFICE O/P EST LOW 20 MIN: CPT | Performed by: ADVANCED PRACTICE MIDWIFE

## 2025-04-07 NOTE — PROGRESS NOTES
Cammie Goldberg is here at 37w2d for:    Chief Complaint   Patient presents with    Routine Prenatal Visit       Estimated Due Date: Estimated Date of Delivery: 25    OB History    Para Term  AB Living   1        SAB IAB Ectopic Molar Multiple Live Births              # Outcome Date GA Lbr Akhil/2nd Weight Sex Type Anes PTL Lv   1 Current                 No past medical history on file.    No past surgical history on file.    Social History     Tobacco Use   Smoking Status Never   Smokeless Tobacco Never        Social History     Substance and Sexual Activity   Alcohol Use Never               HPI: here for routine ob visit, denies c/o     PT denies fever, chills, nausea and vomiting       Vitals:  Estimated body mass index is 26.4 kg/m² as calculated from the following:    Height as of 3/27/25: 1.753 m (5' 9\").    Weight as of this encounter: 81.1 kg (178 lb 12.8 oz).  BP: 112/70  Weight - Scale: 81.1 kg (178 lb 12.8 oz)  Pulse: 87  Patient Position: Sitting  Albumin: Negative  Glucose: Negative  Fundal Height (cm): 36 cm  Fetal HR: 145  Movement: Present  Presentation: Vertex  Dilation (cm): Fingertip  Station: -1  Comments: mid           Abdomen: gravid,soft, nontender    Results reviewed today:    No results found for this visit on 25.        ASSESSMENT & Plan   Assessment & Plan  37 weeks gestation of pregnancy   Chronic, at goal (stable), continue current treatment plan         Encounter for supervision of normal first pregnancy in third trimester   Chronic, at goal (stable), continue current treatment plan                 I am having Cammie Goldberg maintain her Prenatal MV-Min-Fe Fum-FA-DHA (PRENATAL 1 PO).    Return in about 1 week (around 2025) for ob.    There are no Patient Instructions on file for this visit.             Anamaria Trinidad, EVAN - SUSAN,2025 10:13 AM

## 2025-04-08 RX ORDER — HYDROCORTISONE 25 MG/G
CREAM TOPICAL
Qty: 1 EACH | Refills: 0 | Status: SHIPPED | OUTPATIENT
Start: 2025-04-08

## 2025-04-15 ENCOUNTER — ANCILLARY PROCEDURE (OUTPATIENT)
Dept: OBGYN | Age: 35
End: 2025-04-15
Payer: MEDICAID

## 2025-04-15 ENCOUNTER — ROUTINE PRENATAL (OUTPATIENT)
Dept: OBGYN | Age: 35
End: 2025-04-15
Payer: MEDICAID

## 2025-04-15 ENCOUNTER — HOSPITAL ENCOUNTER (OUTPATIENT)
Age: 35
Setting detail: SPECIMEN
Discharge: HOME | End: 2025-04-15
Payer: MEDICAID

## 2025-04-15 VITALS
DIASTOLIC BLOOD PRESSURE: 62 MMHG | HEART RATE: 71 BPM | BODY MASS INDEX: 26.73 KG/M2 | WEIGHT: 181 LBS | SYSTOLIC BLOOD PRESSURE: 114 MMHG

## 2025-04-15 DIAGNOSIS — Z3A.38 38 WEEKS GESTATION OF PREGNANCY: ICD-10-CM

## 2025-04-15 DIAGNOSIS — Z87.440 HISTORY OF UTI: ICD-10-CM

## 2025-04-15 DIAGNOSIS — Z34.03 ENCOUNTER FOR SUPERVISION OF NORMAL FIRST PREGNANCY IN THIRD TRIMESTER: Primary | ICD-10-CM

## 2025-04-15 DIAGNOSIS — Z36.89 ENCOUNTER FOR ULTRASOUND TO ASSESS FETAL GROWTH: ICD-10-CM

## 2025-04-15 DIAGNOSIS — O92.70 LACTATION DISORDER: ICD-10-CM

## 2025-04-15 PROCEDURE — 87086 URINE CULTURE/COLONY COUNT: CPT

## 2025-04-15 PROCEDURE — 99213 OFFICE O/P EST LOW 20 MIN: CPT | Performed by: ADVANCED PRACTICE MIDWIFE

## 2025-04-15 PROCEDURE — 76816 OB US FOLLOW-UP PER FETUS: CPT | Performed by: OBSTETRICS & GYNECOLOGY

## 2025-04-15 RX ORDER — BREAST PUMP
EACH MISCELLANEOUS
Qty: 1 EACH | Refills: 0 | Status: SHIPPED | OUTPATIENT
Start: 2025-04-15

## 2025-04-15 NOTE — PROGRESS NOTES
Cammie Goldberg is here at 38w3d for:    Chief Complaint   Patient presents with    Routine Prenatal Visit     Follow up in house ultrasound. Would like prescription for breast pump. Would like urine checked for UTI, no symptoms however previous h/o UTI.        Estimated Due Date: Estimated Date of Delivery: 25    OB History    Para Term  AB Living   1        SAB IAB Ectopic Molar Multiple Live Births              # Outcome Date GA Lbr Akhil/2nd Weight Sex Type Anes PTL Lv   1 Current                 No past medical history on file.    No past surgical history on file.    Social History     Tobacco Use   Smoking Status Never   Smokeless Tobacco Never        Social History     Substance and Sexual Activity   Alcohol Use Never               HPI: here for routine ob visit, is concerned about possible uti recurrent     PT denies fever, chills, nausea and vomiting       Vitals:  Estimated body mass index is 26.73 kg/m² as calculated from the following:    Height as of 3/27/25: 1.753 m (5' 9\").    Weight as of this encounter: 82.1 kg (181 lb).  BP: 114/62  Weight - Scale: 82.1 kg (181 lb)  Pulse: 71  Patient Position: Sitting  Albumin: Trace  Glucose: Negative  Fundal Height (cm): 37 cm  Fetal HR: 145  Movement: Present  Presentation: Vertex  Dilation (cm): Fingertip           Abdomen: gravid, soft, nontender    Results reviewed today:  Sono  37.6 WK IUP  EFW:64.3% (7lb 10oz)  CL:not well visualized d/t fetal head position  NERY:14.0 cm  HR:146 bpm  Anterior placenta, cephalic presentation  Active fetal movements.  Marginal cord insertion.           ASSESSMENT & Plan   Assessment & Plan  Lactation disorder   Chronic, at goal (stable), continue current treatment plan    Orders:    Misc. Devices (BREAST PUMP) MISC; Medela double pump - plans breastfeeding    History of UTI   Chronic, not at goal (unstable), continue current plan pending work up below    Orders:    Culture, Urine; Future    38 weeks

## 2025-04-16 LAB
MICROORGANISM SPEC CULT: NO GROWTH
SERVICE CMNT-IMP: NORMAL
SPECIMEN DESCRIPTION: NORMAL

## 2025-04-21 ENCOUNTER — ROUTINE PRENATAL (OUTPATIENT)
Dept: OBGYN | Age: 35
End: 2025-04-21
Payer: MEDICAID

## 2025-04-21 VITALS
WEIGHT: 183 LBS | SYSTOLIC BLOOD PRESSURE: 108 MMHG | DIASTOLIC BLOOD PRESSURE: 66 MMHG | HEART RATE: 81 BPM | BODY MASS INDEX: 27.02 KG/M2

## 2025-04-21 DIAGNOSIS — O09.513 AMA (ADVANCED MATERNAL AGE) PRIMIGRAVIDA 35+, THIRD TRIMESTER: ICD-10-CM

## 2025-04-21 DIAGNOSIS — Z3A.39 39 WEEKS GESTATION OF PREGNANCY: Primary | ICD-10-CM

## 2025-04-21 DIAGNOSIS — Z34.03 ENCOUNTER FOR SUPERVISION OF NORMAL FIRST PREGNANCY IN THIRD TRIMESTER: ICD-10-CM

## 2025-04-21 PROCEDURE — 99213 OFFICE O/P EST LOW 20 MIN: CPT | Performed by: ADVANCED PRACTICE MIDWIFE

## 2025-04-21 NOTE — PROGRESS NOTES
Cammie Goldberg is here at 39w2d for:    Chief Complaint   Patient presents with    Routine Prenatal Visit     Unable to obtain urine. Back pain and cramps.        Estimated Due Date: Estimated Date of Delivery: 25    OB History    Para Term  AB Living   1        SAB IAB Ectopic Molar Multiple Live Births              # Outcome Date GA Lbr Akhil/2nd Weight Sex Type Anes PTL Lv   1 Current                 No past medical history on file.    No past surgical history on file.    Social History     Tobacco Use   Smoking Status Never   Smokeless Tobacco Never        Social History     Substance and Sexual Activity   Alcohol Use Never               HPI: here for routine ob visit, denies c/o     PT denies fever, chills, nausea and vomiting       Vitals:  Estimated body mass index is 27.02 kg/m² as calculated from the following:    Height as of 3/27/25: 1.753 m (5' 9\").    Weight as of this encounter: 83 kg (183 lb).  BP: 108/66  Weight - Scale: 83 kg (183 lb)  Pulse: 81  Patient Position: Sitting  Fundal Height (cm): 37 cm  Fetal HR: 142  Movement: Present  Dilation (cm): Fingertip  Station: -1           Abdomen: gravid,soft, nontender    Results reviewed today:    No results found for this visit on 25.        ASSESSMENT & Plan   Assessment & Plan  39 weeks gestation of pregnancy   Chronic, at goal (stable), continue current treatment plan         Encounter for supervision of normal first pregnancy in third trimester   Chronic, at goal (stable), continue current treatment plan         AMA (advanced maternal age) primigravida 35+, third trimester   Chronic, at goal (stable), continue current treatment plan                 I am having Cammie Goldberg maintain her Prenatal MV-Min-Fe Fum-FA-DHA (PRENATAL 1 PO), hydrocortisone, and Breast Pump.    Return in about 3 days (around 2025) for ob and nst (next week monday bpp and thursday nst).    There are no Patient Instructions on file for this

## 2025-04-24 ENCOUNTER — ROUTINE PRENATAL (OUTPATIENT)
Dept: OBGYN | Age: 35
End: 2025-04-24

## 2025-04-24 VITALS — BODY MASS INDEX: 27.02 KG/M2 | WEIGHT: 183 LBS

## 2025-04-24 DIAGNOSIS — Z3A.39 39 WEEKS GESTATION OF PREGNANCY: Primary | ICD-10-CM

## 2025-04-24 DIAGNOSIS — O09.513 PRIMIGRAVIDA OF ADVANCED MATERNAL AGE IN THIRD TRIMESTER: ICD-10-CM

## 2025-04-24 NOTE — PROGRESS NOTES
Cammie Goldberg is here at 39w5d for:    Chief Complaint   Patient presents with    Routine Prenatal Visit     In house NST.        Estimated Due Date: Estimated Date of Delivery: 25    OB History    Para Term  AB Living   1        SAB IAB Ectopic Molar Multiple Live Births              # Outcome Date GA Lbr Akhil/2nd Weight Sex Type Anes PTL Lv   1 Current                 No past medical history on file.    No past surgical history on file.    Social History     Tobacco Use   Smoking Status Never   Smokeless Tobacco Never        Social History     Substance and Sexual Activity   Alcohol Use Never               HPI: here for fetal surveillance, AMA and approaching edc     PT denies fever, chills, nausea and vomiting       Vitals:  Estimated body mass index is 27.02 kg/m² as calculated from the following:    Height as of 3/27/25: 1.753 m (5' 9\").    Weight as of this encounter: 83 kg (183 lb).  Weight - Scale: 83 kg (183 lb)           Abdomen: gravid,soft, nontender    Results reviewed today:  Cammie Goldberg is here at 39w5d for an NST due to AMA    FHT;       Baseline Heart Rate:  135        Accelerations:  present       Long Term Variability:  moderate       Decelerations:  absent         Contraction frequency: irregular minutes    NST Time start:  1029  NST Time stop: 1101    NST: reactive           No results found for this visit on 25.        ASSESSMENT & Plan   Assessment & Plan  39 weeks gestation of pregnancy   wellness         Primigravida of advanced maternal age in third trimester    wellness    Orders:    FETAL NON-STRESS TEST            I am having Cammie Goldberg maintain her Prenatal MV-Min-Fe Fum-FA-DHA (PRENATAL 1 PO), hydrocortisone, and Breast Pump.    No follow-ups on file.    There are no Patient Instructions on file for this visit.             EVAN Raines - SUSAN,2025 4:57 PM

## 2025-04-28 ENCOUNTER — ROUTINE PRENATAL (OUTPATIENT)
Dept: OBGYN | Age: 35
End: 2025-04-28
Payer: MEDICAID

## 2025-04-28 VITALS
SYSTOLIC BLOOD PRESSURE: 116 MMHG | BODY MASS INDEX: 27.14 KG/M2 | HEART RATE: 75 BPM | DIASTOLIC BLOOD PRESSURE: 62 MMHG | WEIGHT: 183.8 LBS

## 2025-04-28 DIAGNOSIS — Z3A.40 40 WEEKS GESTATION OF PREGNANCY: ICD-10-CM

## 2025-04-28 DIAGNOSIS — Z34.03 ENCOUNTER FOR SUPERVISION OF NORMAL FIRST PREGNANCY IN THIRD TRIMESTER: Primary | ICD-10-CM

## 2025-04-28 DIAGNOSIS — O48.0 POST-TERM PREGNANCY, 40-42 WEEKS OF GESTATION: ICD-10-CM

## 2025-04-28 PROCEDURE — 99213 OFFICE O/P EST LOW 20 MIN: CPT | Performed by: ADVANCED PRACTICE MIDWIFE

## 2025-04-28 NOTE — PROGRESS NOTES
Cammei Goldberg is here at 40w2d for:    Chief Complaint   Patient presents with    Routine Prenatal Visit     Follow up in house ultrasound.        Estimated Due Date: Estimated Date of Delivery: 25    OB History    Para Term  AB Living   1        SAB IAB Ectopic Molar Multiple Live Births              # Outcome Date GA Lbr Akhil/2nd Weight Sex Type Anes PTL Lv   1 Current                 No past medical history on file.    No past surgical history on file.    Social History     Tobacco Use   Smoking Status Never   Smokeless Tobacco Never        Social History     Substance and Sexual Activity   Alcohol Use Never               HPI: here for routine ob visit and postdates mgt, denies /o     PT denies fever, chills, nausea and vomiting       Vitals:  Estimated body mass index is 27.14 kg/m² as calculated from the following:    Height as of 3/27/25: 1.753 m (5' 9\").    Weight as of this encounter: 83.4 kg (183 lb 12.8 oz).  BP: 116/62  Weight - Scale: 83.4 kg (183 lb 12.8 oz)  Pulse: 75  Patient Position: Sitting  Albumin: Trace  Glucose: Negative  Fundal Height (cm): 38 cm  Fetal HR: 138  Movement: Present  Presentation: Vertex  Dilation (cm): 1  Effacement: 80  Station: -2  Comments: tight 1 cm, anterior           Abdomen: gravid,soft, nontender    Results reviewed today:  BPP-   NERY- 11.2cm  HR- 143bpm  Position- cephalic  Placenta- anterior, grade 3  Active fetal movements           ASSESSMENT & Plan   Assessment & Plan  Encounter for supervision of normal first pregnancy in third trimester   Chronic, at goal (stable), continue current treatment plan         Post-term pregnancy, 40-42 weeks of gestation   Chronic, not at goal (unstable), patient desires post date mgt, if pregnancy continues next week will consider induction         40 weeks gestation of pregnancy   Chronic, at goal (stable), continue current treatment plan                 I am having Cammie Goldberg maintain her Prenatal

## 2025-05-01 ENCOUNTER — ROUTINE PRENATAL (OUTPATIENT)
Dept: OBGYN | Age: 35
End: 2025-05-01

## 2025-05-01 VITALS
HEART RATE: 82 BPM | DIASTOLIC BLOOD PRESSURE: 64 MMHG | BODY MASS INDEX: 27.62 KG/M2 | SYSTOLIC BLOOD PRESSURE: 118 MMHG | WEIGHT: 187 LBS

## 2025-05-01 DIAGNOSIS — O09.513 AMA (ADVANCED MATERNAL AGE) PRIMIGRAVIDA 35+, THIRD TRIMESTER: ICD-10-CM

## 2025-05-01 DIAGNOSIS — Z3A.40 40 WEEKS GESTATION OF PREGNANCY: Primary | ICD-10-CM

## 2025-05-01 DIAGNOSIS — O48.0 POST-TERM PREGNANCY, 40-42 WEEKS OF GESTATION: ICD-10-CM

## 2025-05-02 NOTE — PROGRESS NOTES
(advanced maternal age) primigravida 35+, third trimester   Chronic, at goal (stable), continue current treatment plan    Orders:    FETAL NON-STRESS TEST    Patient had initially declined induction but with 41 weeks next week will agree at that time.        I am having Cammie Santanaer maintain her Prenatal MV-Min-Fe Fum-FA-DHA (PRENATAL 1 PO), hydrocortisone, and Breast Pump.    Return in about 3 days (around 5/4/2025) for ob sono for efw and bpp.    There are no Patient Instructions on file for this visit.             Anamaria Trinidad, EVAN - SUSAN,5/2/2025 10:15 AM

## 2025-05-03 ENCOUNTER — APPOINTMENT (OUTPATIENT)
Dept: ULTRASOUND IMAGING | Age: 35
End: 2025-05-03
Payer: MEDICAID

## 2025-05-03 ENCOUNTER — HOSPITAL ENCOUNTER (INPATIENT)
Age: 35
LOS: 1 days | Discharge: HOME | End: 2025-05-03
Attending: OBSTETRICS & GYNECOLOGY | Admitting: ADVANCED PRACTICE MIDWIFE
Payer: MEDICAID

## 2025-05-03 VITALS
RESPIRATION RATE: 14 BRPM | SYSTOLIC BLOOD PRESSURE: 109 MMHG | TEMPERATURE: 97.3 F | DIASTOLIC BLOOD PRESSURE: 68 MMHG | HEART RATE: 77 BPM

## 2025-05-03 PROBLEM — O48.0 41 WEEKS GESTATION OF PREGNANCY: Status: ACTIVE | Noted: 2025-05-03

## 2025-05-03 PROBLEM — O42.90 PREMATURE RUPTURE OF MEMBRANES: Status: ACTIVE | Noted: 2025-05-03

## 2025-05-03 PROBLEM — Z3A.41 41 WEEKS GESTATION OF PREGNANCY: Status: ACTIVE | Noted: 2025-05-03

## 2025-05-03 LAB
BACTERIA URNS QL MICRO: ABNORMAL
BILIRUB UR QL STRIP: NEGATIVE
CLARITY UR: CLEAR
COLOR UR: YELLOW
EPI CELLS #/AREA URNS HPF: ABNORMAL /HPF (ref 0–25)
GLUCOSE UR STRIP-MCNC: NEGATIVE MG/DL
HGB UR QL STRIP.AUTO: ABNORMAL
KETONES UR STRIP-MCNC: NEGATIVE MG/DL
LEUKOCYTE ESTERASE UR QL STRIP: ABNORMAL
NITRITE UR QL STRIP: NEGATIVE
PH UR STRIP: 6.5 [PH] (ref 5–9)
PROT UR STRIP-MCNC: NEGATIVE MG/DL
RBC #/AREA URNS HPF: ABNORMAL /HPF (ref 0–2)
SP GR UR STRIP: <1.005 (ref 1.01–1.02)
UROBILINOGEN UR STRIP-ACNC: NORMAL EU/DL (ref 0–1)
WBC #/AREA URNS HPF: ABNORMAL /HPF (ref 0–5)

## 2025-05-03 PROCEDURE — 76818 FETAL BIOPHYS PROFILE W/NST: CPT

## 2025-05-03 PROCEDURE — 81001 URINALYSIS AUTO W/SCOPE: CPT

## 2025-05-03 PROCEDURE — 99221 1ST HOSP IP/OBS SF/LOW 40: CPT | Performed by: ADVANCED PRACTICE MIDWIFE

## 2025-05-03 PROCEDURE — 1220000000 HC SEMI PRIVATE OB R&B

## 2025-05-03 PROCEDURE — 76815 OB US LIMITED FETUS(S): CPT

## 2025-05-03 PROCEDURE — 3E033VJ INTRODUCTION OF OTHER HORMONE INTO PERIPHERAL VEIN, PERCUTANEOUS APPROACH: ICD-10-PCS | Performed by: NURSE PRACTITIONER

## 2025-05-03 RX ORDER — SODIUM CHLORIDE 0.9 % (FLUSH) 0.9 %
5-40 SYRINGE (ML) INJECTION EVERY 12 HOURS SCHEDULED
Status: DISCONTINUED | OUTPATIENT
Start: 2025-05-03 | End: 2025-05-03 | Stop reason: HOSPADM

## 2025-05-03 RX ORDER — LIDOCAINE HYDROCHLORIDE 10 MG/ML
30 INJECTION, SOLUTION EPIDURAL; INFILTRATION; INTRACAUDAL; PERINEURAL PRN
Status: DISCONTINUED | OUTPATIENT
Start: 2025-05-03 | End: 2025-05-03 | Stop reason: HOSPADM

## 2025-05-03 RX ORDER — SODIUM CHLORIDE, SODIUM LACTATE, POTASSIUM CHLORIDE, AND CALCIUM CHLORIDE .6; .31; .03; .02 G/100ML; G/100ML; G/100ML; G/100ML
500 INJECTION, SOLUTION INTRAVENOUS PRN
Status: DISCONTINUED | OUTPATIENT
Start: 2025-05-03 | End: 2025-05-03 | Stop reason: HOSPADM

## 2025-05-03 RX ORDER — CARBOPROST TROMETHAMINE 250 UG/ML
250 INJECTION, SOLUTION INTRAMUSCULAR PRN
Status: DISCONTINUED | OUTPATIENT
Start: 2025-05-03 | End: 2025-05-03 | Stop reason: HOSPADM

## 2025-05-03 RX ORDER — NALBUPHINE HYDROCHLORIDE 10 MG/ML
10 INJECTION INTRAMUSCULAR; INTRAVENOUS; SUBCUTANEOUS
Status: DISCONTINUED | OUTPATIENT
Start: 2025-05-03 | End: 2025-05-03 | Stop reason: HOSPADM

## 2025-05-03 RX ORDER — ACETAMINOPHEN 325 MG/1
650 TABLET ORAL EVERY 6 HOURS
Status: DISCONTINUED | OUTPATIENT
Start: 2025-05-03 | End: 2025-05-03 | Stop reason: HOSPADM

## 2025-05-03 RX ORDER — BUTORPHANOL TARTRATE 2 MG/ML
1 INJECTION, SOLUTION INTRAMUSCULAR; INTRAVENOUS
Status: DISCONTINUED | OUTPATIENT
Start: 2025-05-03 | End: 2025-05-03 | Stop reason: HOSPADM

## 2025-05-03 RX ORDER — METHYLERGONOVINE MALEATE 0.2 MG/ML
200 INJECTION INTRAVENOUS PRN
Status: DISCONTINUED | OUTPATIENT
Start: 2025-05-03 | End: 2025-05-03 | Stop reason: HOSPADM

## 2025-05-03 RX ORDER — SODIUM CHLORIDE, SODIUM LACTATE, POTASSIUM CHLORIDE, AND CALCIUM CHLORIDE .6; .31; .03; .02 G/100ML; G/100ML; G/100ML; G/100ML
1000 INJECTION, SOLUTION INTRAVENOUS PRN
Status: DISCONTINUED | OUTPATIENT
Start: 2025-05-03 | End: 2025-05-03 | Stop reason: HOSPADM

## 2025-05-03 RX ORDER — SODIUM CHLORIDE, SODIUM LACTATE, POTASSIUM CHLORIDE, CALCIUM CHLORIDE 600; 310; 30; 20 MG/100ML; MG/100ML; MG/100ML; MG/100ML
INJECTION, SOLUTION INTRAVENOUS CONTINUOUS
Status: DISCONTINUED | OUTPATIENT
Start: 2025-05-03 | End: 2025-05-03 | Stop reason: HOSPADM

## 2025-05-03 RX ORDER — ONDANSETRON 4 MG/1
4 TABLET, ORALLY DISINTEGRATING ORAL EVERY 6 HOURS PRN
Status: DISCONTINUED | OUTPATIENT
Start: 2025-05-03 | End: 2025-05-03 | Stop reason: HOSPADM

## 2025-05-03 RX ORDER — SEVOFLURANE 250 ML/250ML
1 LIQUID RESPIRATORY (INHALATION) CONTINUOUS PRN
Status: DISCONTINUED | OUTPATIENT
Start: 2025-05-03 | End: 2025-05-03 | Stop reason: HOSPADM

## 2025-05-03 RX ORDER — ONDANSETRON 2 MG/ML
4 INJECTION INTRAMUSCULAR; INTRAVENOUS EVERY 6 HOURS PRN
Status: DISCONTINUED | OUTPATIENT
Start: 2025-05-03 | End: 2025-05-03 | Stop reason: HOSPADM

## 2025-05-03 NOTE — FLOWSHEET NOTE
Pt. Arrives with c/o leaking of clear fluid. Oriented to room 207, gown provided. EFM and TOCO placed on pt., abdomen soft and non-tender, poc explained, amni swab completed and negative, SVE: 1/60/-2, no blood or fluid noted with exam.

## 2025-05-03 NOTE — FLOWSHEET NOTE
ASHIA Rodriguez calls back, plan discussed to admit pt. For a cytotec to pitocin induction for questionable rupture of membranes, will place orders.

## 2025-05-03 NOTE — FLOWSHEET NOTE
ASHIA Trinidad CNM called and updated on pt. Arrival and c/o leaking of fluid, negative amni swab, SVE 1cm, new orders received, see order set.

## 2025-05-03 NOTE — H&P
pelvis  Cervix: FT   Contraction frequency:  rare    Membranes:  questionable slow leaking of fluid    Labs: CBC with Differential:    Lab Results   Component Value Date/Time    WBC 10.1 11/20/2024 10:30 AM    RBC 3.72 11/20/2024 10:30 AM    HGB 11.1 02/03/2025 11:38 AM    HCT 34.4 11/20/2024 10:30 AM     11/20/2024 10:30 AM    MCV 92.5 11/20/2024 10:30 AM    MCH 30.6 11/20/2024 10:30 AM    MCHC 33.1 11/20/2024 10:30 AM    RDW 14.0 11/20/2024 10:30 AM    LYMPHOPCT 13 11/20/2024 10:30 AM    MONOPCT 9 11/20/2024 10:30 AM    EOSPCT 1 11/20/2024 10:30 AM    BASOPCT 0 11/20/2024 10:30 AM    MONOSABS 0.87 11/20/2024 10:30 AM    LYMPHSABS 1.32 11/20/2024 10:30 AM    EOSABS 0.12 11/20/2024 10:30 AM    BASOSABS 0.03 11/20/2024 10:30 AM     CMP:  No results found for: \"NA\", \"K\", \"CL\", \"CO2\", \"BUN\", \"CREATININE\", \"GFRAA\", \"AGRATIO\", \"LABGLOM\", \"GLUCOSE\", \"GLU\", \"LABALBU\", \"CALCIUM\", \"BILITOT\", \"ALKPHOS\", \"AST\", \"ALT\"  U/A:    Lab Results   Component Value Date/Time    COLORU Yellow 05/03/2025 10:30 AM    PROTEINU NEGATIVE 05/03/2025 10:30 AM    PHUR 6.5 05/03/2025 10:30 AM    WBCUA 5 TO 10 05/03/2025 10:30 AM    RBCUA 0 TO 2 05/03/2025 10:30 AM    MUCUS 3+ 03/27/2025 12:19 PM    BACTERIA TRACE 05/03/2025 10:30 AM    LEUKOCYTESUR MODERATE 05/03/2025 10:30 AM    UROBILINOGEN Normal 05/03/2025 10:30 AM    BILIRUBINUR NEGATIVE 05/03/2025 10:30 AM    GLUCOSEU NEGATIVE 05/03/2025 10:30 AM       ASSESSMENT AND PLAN:    Estimated length of stay: 3 days    Principal Problem:    41 weeks gestation of pregnancy    Active Problems:    Premature rupture of membranes  Plan: possible slow leak      Labor: Admit, anticipate normal delivery, routine labor orders  Fetus: Reassuring, Cat 1  GBS: Yes  Other: after discussion/ consult with Dr. Aguirre regarding possible slow leak, given the patient's other risk factors of AMA and post dates will initiate IV antibiotic therapy, and cervical ripening then induction.  Monitor closely maternal  and fetal status and  any further signs of SROM       Anamaria Trinidad CNM,5/3/2025 12:29 PM

## 2025-05-03 NOTE — FLOWSHEET NOTE
POC discussed with pt. For admission, pt. Would prefer to go home. ASHIA Rodriguez called and discussed pt. Preference to go home. States ok for pt. To go home and return to the unit tomorrow for a repeat BPP, NERY check. Discussed with pt. And ok with plan.

## 2025-05-03 NOTE — FLOWSHEET NOTE
ASHIA Trinidad CNM called and updated on US results. States she will talk with Dr. Aguirre and call back with a plan.

## 2025-05-04 NOTE — DISCHARGE SUMMARY
Course:  34 year old  female at 41+0 days gestation c/o damp underwear at home.  On admission to hospital negative amnisure and no fluid pooling or on pad, dry on SVE.  W/u showed NERY of 7.5cm down from 11.2 cm in office on ; bpp 8/8 (with reactive nst 10/10) and EFW 6lbs 11oz, down from 7 lbs 10 oz on sono from 4/15.  After discussion/ consult with Dr. Aguirre orders given for IV antibiotic therapy, and cervical ripening then induction.  Monitor closely maternal and fetal status and  any further signs of SROM.  Patient declined induction and requested discharge home at that time.    DX:  Patient Active Problem List    Diagnosis Date Noted    41 weeks gestation of pregnancy 2025    Premature rupture of membranes 2025    Decreased fetal movement affecting management of mother, antepartum 2025       History reviewed. No pertinent past medical history.    Condition: stable    Fetal status: reassuring    Diet : regular as tolerated    Activities: up ad hesham      No current facility-administered medications on file prior to encounter.     Current Outpatient Medications on File Prior to Encounter   Medication Sig Dispense Refill    Misc. Devices (BREAST PUMP) MISC Medela double pump - plans breastfeeding 1 each 0    hydrocortisone (ANUSOL-HC) 2.5 % CREA rectal cream Apply twice daily. 1 each 0    Prenatal MV-Min-Fe Fum-FA-DHA (PRENATAL 1 PO) Take by mouth         Follow up tomorrow outpatient, recheck NERY and NST at that time. Staff to instruct on return to hospital for any signs of labor, further fluid leaking, contractions, or decreased fetal movement.

## 2025-05-05 ENCOUNTER — ANESTHESIA (OUTPATIENT)
Dept: LABOR AND DELIVERY | Age: 35
DRG: 560 | End: 2025-05-05
Payer: MEDICAID

## 2025-05-05 ENCOUNTER — ANESTHESIA EVENT (OUTPATIENT)
Dept: LABOR AND DELIVERY | Age: 35
DRG: 560 | End: 2025-05-05
Payer: MEDICAID

## 2025-05-05 ENCOUNTER — HOSPITAL ENCOUNTER (INPATIENT)
Age: 35
LOS: 3 days | Discharge: HOME | DRG: 560 | End: 2025-05-08
Attending: ADVANCED PRACTICE MIDWIFE | Admitting: ADVANCED PRACTICE MIDWIFE
Payer: MEDICAID

## 2025-05-05 ENCOUNTER — ROUTINE PRENATAL (OUTPATIENT)
Dept: OBGYN | Age: 35
End: 2025-05-05
Payer: MEDICAID

## 2025-05-05 VITALS
SYSTOLIC BLOOD PRESSURE: 114 MMHG | BODY MASS INDEX: 27.35 KG/M2 | HEART RATE: 80 BPM | WEIGHT: 185.2 LBS | DIASTOLIC BLOOD PRESSURE: 62 MMHG

## 2025-05-05 DIAGNOSIS — O09.513 PRIMIGRAVIDA OF ADVANCED MATERNAL AGE IN THIRD TRIMESTER: ICD-10-CM

## 2025-05-05 DIAGNOSIS — O41.00X0 OLIGOHYDRAMNIOS, ANTEPARTUM, SINGLE OR UNSPECIFIED FETUS: ICD-10-CM

## 2025-05-05 DIAGNOSIS — O48.0 POST-TERM PREGNANCY, 40-42 WEEKS OF GESTATION: ICD-10-CM

## 2025-05-05 DIAGNOSIS — Z3A.41 41 WEEKS GESTATION OF PREGNANCY: Primary | ICD-10-CM

## 2025-05-05 DIAGNOSIS — O48.0 41 WEEKS GESTATION OF PREGNANCY: Primary | ICD-10-CM

## 2025-05-05 PROBLEM — Z34.93 THIRD TRIMESTER PREGNANCY: Status: ACTIVE | Noted: 2025-05-05

## 2025-05-05 LAB
ABO + RH BLD: NORMAL
ALBUMIN SERPL-MCNC: 3.8 G/DL (ref 3.5–5.2)
ALBUMIN/GLOB SERPL: 1.2 {RATIO} (ref 1–2.5)
ALP SERPL-CCNC: 183 U/L (ref 35–104)
ALT SERPL-CCNC: 21 U/L (ref 10–35)
ANION GAP SERPL CALCULATED.3IONS-SCNC: 12 MMOL/L (ref 9–16)
ARM BAND NUMBER: NORMAL
AST SERPL-CCNC: 34 U/L (ref 10–35)
BASOPHILS # BLD: 0.07 K/UL (ref 0–0.2)
BASOPHILS NFR BLD: 1 % (ref 0–2)
BILIRUB SERPL-MCNC: 0.3 MG/DL (ref 0–1.2)
BLOOD BANK SAMPLE EXPIRATION: NORMAL
BLOOD GROUP ANTIBODIES SERPL: NEGATIVE
BUN SERPL-MCNC: 13 MG/DL (ref 6–20)
BUN/CREAT SERPL: 19 (ref 9–20)
CALCIUM SERPL-MCNC: 8.8 MG/DL (ref 8.6–10.4)
CHLORIDE SERPL-SCNC: 104 MMOL/L (ref 98–107)
CO2 SERPL-SCNC: 20 MMOL/L (ref 20–31)
CREAT SERPL-MCNC: 0.7 MG/DL (ref 0.5–0.9)
EOSINOPHIL # BLD: 0.13 K/UL (ref 0–0.44)
EOSINOPHILS RELATIVE PERCENT: 1 % (ref 1–4)
ERYTHROCYTE [DISTWIDTH] IN BLOOD BY AUTOMATED COUNT: 13.9 % (ref 11.8–14.4)
GFR, ESTIMATED: >90 ML/MIN/1.73M2
GLUCOSE SERPL-MCNC: 104 MG/DL (ref 74–99)
HCT VFR BLD AUTO: 39.8 % (ref 36.3–47.1)
HGB BLD-MCNC: 13.6 G/DL (ref 11.9–15.1)
IMM GRANULOCYTES # BLD AUTO: 0.16 K/UL (ref 0–0.3)
IMM GRANULOCYTES NFR BLD: 1 %
LYMPHOCYTES NFR BLD: 1.78 K/UL (ref 1.1–3.7)
LYMPHOCYTES RELATIVE PERCENT: 13 % (ref 24–43)
MCH RBC QN AUTO: 30.9 PG (ref 25.2–33.5)
MCHC RBC AUTO-ENTMCNC: 34.2 G/DL (ref 28.4–34.8)
MCV RBC AUTO: 90.5 FL (ref 82.6–102.9)
MONOCYTES NFR BLD: 1.35 K/UL (ref 0.1–1.2)
MONOCYTES NFR BLD: 10 % (ref 3–12)
NEUTROPHILS NFR BLD: 74 % (ref 36–65)
NEUTS SEG NFR BLD: 10.42 K/UL (ref 1.5–8.1)
NRBC BLD-RTO: 0 PER 100 WBC
PLATELET # BLD AUTO: 214 K/UL (ref 138–453)
PMV BLD AUTO: 10.3 FL (ref 8.1–13.5)
POTASSIUM SERPL-SCNC: 4 MMOL/L (ref 3.7–5.3)
PROT SERPL-MCNC: 7 G/DL (ref 6.6–8.7)
RBC # BLD AUTO: 4.4 M/UL (ref 3.95–5.11)
SODIUM SERPL-SCNC: 136 MMOL/L (ref 136–145)
WBC OTHER # BLD: 13.9 K/UL (ref 3.5–11.3)

## 2025-05-05 PROCEDURE — 6360000002 HC RX W HCPCS: Performed by: ADVANCED PRACTICE MIDWIFE

## 2025-05-05 PROCEDURE — 2580000003 HC RX 258: Performed by: ADVANCED PRACTICE MIDWIFE

## 2025-05-05 PROCEDURE — 6360000002 HC RX W HCPCS

## 2025-05-05 PROCEDURE — 1220000000 HC SEMI PRIVATE OB R&B

## 2025-05-05 PROCEDURE — 99213 OFFICE O/P EST LOW 20 MIN: CPT | Performed by: ADVANCED PRACTICE MIDWIFE

## 2025-05-05 PROCEDURE — 3700000025 EPIDURAL BLOCK

## 2025-05-05 PROCEDURE — 86850 RBC ANTIBODY SCREEN: CPT

## 2025-05-05 PROCEDURE — 6370000000 HC RX 637 (ALT 250 FOR IP): Performed by: ADVANCED PRACTICE MIDWIFE

## 2025-05-05 PROCEDURE — 85025 COMPLETE CBC W/AUTO DIFF WBC: CPT

## 2025-05-05 PROCEDURE — 80053 COMPREHEN METABOLIC PANEL: CPT

## 2025-05-05 PROCEDURE — 86900 BLOOD TYPING SEROLOGIC ABO: CPT

## 2025-05-05 PROCEDURE — 86901 BLOOD TYPING SEROLOGIC RH(D): CPT

## 2025-05-05 RX ORDER — METHYLERGONOVINE MALEATE 0.2 MG/ML
200 INJECTION INTRAVENOUS PRN
Status: DISCONTINUED | OUTPATIENT
Start: 2025-05-05 | End: 2025-05-08 | Stop reason: HOSPADM

## 2025-05-05 RX ORDER — LIDOCAINE HYDROCHLORIDE 10 MG/ML
30 INJECTION, SOLUTION EPIDURAL; INFILTRATION; INTRACAUDAL; PERINEURAL PRN
Status: DISCONTINUED | OUTPATIENT
Start: 2025-05-05 | End: 2025-05-06

## 2025-05-05 RX ORDER — LIDOCAINE HYDROCHLORIDE 20 MG/ML
INJECTION, SOLUTION EPIDURAL; INFILTRATION; INTRACAUDAL; PERINEURAL
Status: DISCONTINUED | OUTPATIENT
Start: 2025-05-05 | End: 2025-05-06 | Stop reason: SDUPTHER

## 2025-05-05 RX ORDER — ROPIVACAINE HYDROCHLORIDE 2 MG/ML
10 INJECTION, SOLUTION EPIDURAL; INFILTRATION; PERINEURAL CONTINUOUS
Status: DISCONTINUED | OUTPATIENT
Start: 2025-05-05 | End: 2025-05-06

## 2025-05-05 RX ORDER — SEVOFLURANE 250 ML/250ML
1 LIQUID RESPIRATORY (INHALATION) CONTINUOUS PRN
Status: DISCONTINUED | OUTPATIENT
Start: 2025-05-05 | End: 2025-05-06

## 2025-05-05 RX ORDER — SODIUM CHLORIDE, SODIUM LACTATE, POTASSIUM CHLORIDE, CALCIUM CHLORIDE 600; 310; 30; 20 MG/100ML; MG/100ML; MG/100ML; MG/100ML
INJECTION, SOLUTION INTRAVENOUS CONTINUOUS
Status: DISCONTINUED | OUTPATIENT
Start: 2025-05-05 | End: 2025-05-06

## 2025-05-05 RX ORDER — SODIUM CHLORIDE, SODIUM LACTATE, POTASSIUM CHLORIDE, AND CALCIUM CHLORIDE .6; .31; .03; .02 G/100ML; G/100ML; G/100ML; G/100ML
500 INJECTION, SOLUTION INTRAVENOUS PRN
Status: DISCONTINUED | OUTPATIENT
Start: 2025-05-05 | End: 2025-05-06

## 2025-05-05 RX ORDER — BUTORPHANOL TARTRATE 2 MG/ML
1 INJECTION, SOLUTION INTRAMUSCULAR; INTRAVENOUS
Status: DISCONTINUED | OUTPATIENT
Start: 2025-05-05 | End: 2025-05-06

## 2025-05-05 RX ORDER — ACETAMINOPHEN 325 MG/1
650 TABLET ORAL EVERY 4 HOURS PRN
Status: DISCONTINUED | OUTPATIENT
Start: 2025-05-05 | End: 2025-05-06

## 2025-05-05 RX ORDER — ONDANSETRON 2 MG/ML
4 INJECTION INTRAMUSCULAR; INTRAVENOUS EVERY 6 HOURS PRN
Status: DISCONTINUED | OUTPATIENT
Start: 2025-05-05 | End: 2025-05-06

## 2025-05-05 RX ORDER — EPHEDRINE SULFATE/0.9% NACL/PF 25 MG/5 ML
5 SYRINGE (ML) INTRAVENOUS EVERY 5 MIN PRN
Status: DISCONTINUED | OUTPATIENT
Start: 2025-05-05 | End: 2025-05-06

## 2025-05-05 RX ORDER — SODIUM CHLORIDE 0.9 % (FLUSH) 0.9 %
5-40 SYRINGE (ML) INJECTION EVERY 12 HOURS SCHEDULED
Status: DISCONTINUED | OUTPATIENT
Start: 2025-05-05 | End: 2025-05-06

## 2025-05-05 RX ORDER — SODIUM CHLORIDE, SODIUM LACTATE, POTASSIUM CHLORIDE, AND CALCIUM CHLORIDE .6; .31; .03; .02 G/100ML; G/100ML; G/100ML; G/100ML
1000 INJECTION, SOLUTION INTRAVENOUS PRN
Status: DISCONTINUED | OUTPATIENT
Start: 2025-05-05 | End: 2025-05-06

## 2025-05-05 RX ORDER — NALOXONE HYDROCHLORIDE 0.4 MG/ML
INJECTION, SOLUTION INTRAMUSCULAR; INTRAVENOUS; SUBCUTANEOUS PRN
Status: DISCONTINUED | OUTPATIENT
Start: 2025-05-05 | End: 2025-05-06

## 2025-05-05 RX ORDER — CARBOPROST TROMETHAMINE 250 UG/ML
250 INJECTION, SOLUTION INTRAMUSCULAR PRN
Status: DISCONTINUED | OUTPATIENT
Start: 2025-05-05 | End: 2025-05-06

## 2025-05-05 RX ORDER — VANCOMYCIN 1.25 G/250ML
1750 INJECTION, SOLUTION INTRAVENOUS EVERY 8 HOURS
Status: DISCONTINUED | OUTPATIENT
Start: 2025-05-05 | End: 2025-05-06

## 2025-05-05 RX ORDER — ONDANSETRON 4 MG/1
4 TABLET, ORALLY DISINTEGRATING ORAL EVERY 6 HOURS PRN
Status: DISCONTINUED | OUTPATIENT
Start: 2025-05-05 | End: 2025-05-06

## 2025-05-05 RX ORDER — NALBUPHINE HYDROCHLORIDE 10 MG/ML
10 INJECTION INTRAMUSCULAR; INTRAVENOUS; SUBCUTANEOUS
Status: DISCONTINUED | OUTPATIENT
Start: 2025-05-05 | End: 2025-05-06

## 2025-05-05 RX ORDER — ROPIVACAINE HYDROCHLORIDE 2 MG/ML
INJECTION, SOLUTION EPIDURAL; INFILTRATION; PERINEURAL
Status: DISCONTINUED | OUTPATIENT
Start: 2025-05-05 | End: 2025-05-06 | Stop reason: SDUPTHER

## 2025-05-05 RX ADMIN — SODIUM CHLORIDE, POTASSIUM CHLORIDE, SODIUM LACTATE AND CALCIUM CHLORIDE: 600; 310; 30; 20 INJECTION, SOLUTION INTRAVENOUS at 19:43

## 2025-05-05 RX ADMIN — LIDOCAINE HYDROCHLORIDE 2.5 ML: 20 INJECTION, SOLUTION EPIDURAL; INFILTRATION; INTRACAUDAL; PERINEURAL at 21:49

## 2025-05-05 RX ADMIN — ACETAMINOPHEN 650 MG: 325 TABLET ORAL at 15:01

## 2025-05-05 RX ADMIN — LIDOCAINE HYDROCHLORIDE 2.5 ML: 20 INJECTION, SOLUTION EPIDURAL; INFILTRATION; INTRACAUDAL; PERINEURAL at 21:45

## 2025-05-05 RX ADMIN — ROPIVACAINE HYDROCHLORIDE 10 ML/HR: 2 INJECTION, SOLUTION EPIDURAL; INFILTRATION at 21:50

## 2025-05-05 RX ADMIN — Medication 25 MCG: at 15:01

## 2025-05-05 RX ADMIN — Medication 25 MCG: at 11:01

## 2025-05-05 RX ADMIN — NALBUPHINE HYDROCHLORIDE 10 MG: 10 INJECTION INTRAMUSCULAR; INTRAVENOUS; SUBCUTANEOUS at 20:28

## 2025-05-05 RX ADMIN — SODIUM CHLORIDE, SODIUM LACTATE, POTASSIUM CHLORIDE, AND CALCIUM CHLORIDE 1000 ML: .6; .31; .03; .02 INJECTION, SOLUTION INTRAVENOUS at 21:28

## 2025-05-05 RX ADMIN — VANCOMYCIN 1750 MG: 1.25 INJECTION, SOLUTION INTRAVENOUS at 19:43

## 2025-05-05 ASSESSMENT — PAIN DESCRIPTION - LOCATION
LOCATION: ABDOMEN
LOCATION: ABDOMEN;BACK

## 2025-05-05 ASSESSMENT — PAIN DESCRIPTION - DESCRIPTORS
DESCRIPTORS: CRAMPING
DESCRIPTORS: CRAMPING

## 2025-05-05 ASSESSMENT — PAIN SCALES - GENERAL
PAINLEVEL_OUTOF10: 4
PAINLEVEL_OUTOF10: 8

## 2025-05-05 ASSESSMENT — PAIN DESCRIPTION - ORIENTATION: ORIENTATION: LOWER

## 2025-05-05 NOTE — PROGRESS NOTES
This RN called ELDON Trinidad CNM to make her aware of patient having more then 5 contractions in a 10 minute period. Pit was not started due to this. Will continue to monitor and resume with orders when okay. No questions at this time

## 2025-05-05 NOTE — PROGRESS NOTES
Pt arrives for induction accompanied by . Oriented to room 207, instructed to provide urine sample and change into gown.

## 2025-05-05 NOTE — PROGRESS NOTES
SUSAN Nayak called into the department, discussed that she added a CMP, labels printed and sent to lab. Blood specimen previously collected with initial IV start.   Also discussed with provider having patient saline locked until more active, provider agreeable, primary RN Elsa aware of order and miscellaneous nursing orders.

## 2025-05-05 NOTE — PROGRESS NOTES
SUSAN Nayak called into the department prior to the pt's arrival. Discussed with her POC, reported to give Cytotec x4. Verified d/t potential rupture and drop in NERY from previous US that we would continue to do Cytotec order. Provider reported we can still do Cytotec even with rupture, we usually switch to pitocin, but provider reports to continue with Cytotec.

## 2025-05-05 NOTE — PROGRESS NOTES
Cammie Goldberg is here at 41w2d for:    Chief Complaint   Patient presents with    Routine Prenatal Visit     Follow up in house ultrasound.  Patient was in L&D on 2025 , leaking fluid.        Estimated Due Date: Estimated Date of Delivery: 25    OB History    Para Term  AB Living   1        SAB IAB Ectopic Molar Multiple Live Births              # Outcome Date GA Lbr Akhil/2nd Weight Sex Type Anes PTL Lv   1 Current                 No past medical history on file.    No past surgical history on file.    Social History     Tobacco Use   Smoking Status Never   Smokeless Tobacco Never        Social History     Substance and Sexual Activity   Alcohol Use Never               HPI: here for routine ob visit and fetal surveillance postdates.  Was in L&D over the weekend with leaking fluid, and found unlikely to be ruptured.  Declined induction at that time.     PT denies fever, chills, nausea and vomiting       Vitals:  Estimated body mass index is 27.35 kg/m² as calculated from the following:    Height as of 3/27/25: 1.753 m (5' 9\").    Weight as of this encounter: 84 kg (185 lb 3.2 oz).  BP: 114/62  Weight - Scale: 84 kg (185 lb 3.2 oz)  Pulse: 80  Patient Position: Sitting  Albumin: Trace  Glucose: Negative  Fundal Height (cm): 37 cm  Fetal HR: 142  Movement: Present  Presentation: Vertex  Dilation (cm): 1  Effacement: 70  Station: -2  Comments: tight 1 cm           Abdomen: gravid,soft, nontender    Results reviewed today:    No results found for this visit on 25.        ASSESSMENT & Plan   Assessment & Plan  41 weeks gestation of pregnancy   Chronic, at goal (stable), continue current treatment plan         Post-term pregnancy, 40-42 weeks of gestation   Chronic, at goal (stable), continue current treatment plan         Primigravida of advanced maternal age in third trimester   Chronic, at goal (stable), continue current treatment plan         Oligohydramnios, antepartum, single or

## 2025-05-05 NOTE — H&P
Department of Obstetrics and Gynecology   Obstetrics History and Physical  Anamaria Trinidad [facility]  H&P Admission Inpatient  Note        CHIEF COMPLAINT:   female at 41+2 weeks gestation in for cervical ripening and induction postdates with oligohydramninos noted on sono today.  Had been seen on ob over the weekend with fluid leaking and assessed as not ruptured.  Patient declined induction at that time.     HISTORY OF PRESENT ILLNESS:      The patient is a 34 y.o. female at 41w2d.  OB History          1    Para        Term                AB        Living             SAB        IAB        Ectopic        Molar        Multiple        Live Births                Patient presents with a chief complaint as above and is being admitted for induction and post dates and oligohydramnios    Estimated Due Date: Estimated Date of Delivery: 25    PRENATAL CARE:    Complicated by: post dates and oligohydramnios    PAST OB HISTORY:  OB History          1    Para        Term                AB        Living             SAB        IAB        Ectopic        Molar        Multiple        Live Births                    Past Medical History:    History reviewed. No pertinent past medical history.  Past Surgical History:    History reviewed. No pertinent surgical history.  Allergies:  Amoxicillin, Codeine, Corn-containing products, Gluten meal, Hepatitis b virus vaccines, and Penicillins    Social History:    Social History     Socioeconomic History    Marital status:      Spouse name: Not on file    Number of children: Not on file    Years of education: Not on file    Highest education level: Not on file   Occupational History    Not on file   Tobacco Use    Smoking status: Never    Smokeless tobacco: Never   Vaping Use    Vaping status: Never Used   Substance and Sexual Activity    Alcohol use: Never    Drug use: Never    Sexual activity: Yes     Partners: Male   Other Topics Concern    Not on file

## 2025-05-05 NOTE — CONSULTS
Vancomycin Dosing by Pharmacy - Initial Note   TODAY'S DATE:  5/5/2025  Patient name, age:  Cammie Goldberg, 34 y.o.    Indication: GBS prophylaxis  Additional antimicrobials:  none    Allergies:  Amoxicillin, Codeine, Corn-containing products, Gluten meal, Hepatitis b virus vaccines, and Penicillins   Actual Weight:    Wt Readings from Last 1 Encounters:   05/05/25 84 kg (185 lb 3.2 oz)     Labs/Ancillary Data  CrCl cannot be calculated (No successful lab value found.).  No results for input(s): \"CREATININE\", \"BUN\", \"WBC\" in the last 72 hours.    Invalid input(s): \"CRCL\"  No results found for: \"PROCAL\"  No intake or output data in the 24 hours ending 05/05/25 1008    Recent vancomycin administrations        No vancomycin IV orders with administrations found.            Orders not given:            vancomycin (VANCOCIN) 1,750 mg in sodium chloride 0.9 % 500 mL IVPB    vancomycin (VANCOCIN) intermittent dosing (placeholder)                  Culture Date / Source  /  Results  3/27/25          GBS positive    MRSA Nasal Swab: N/A. Non-respiratory infection..    PLAN   Vancomycin 20 mg/kg q8 = Vancomycin 1750 mg IVPB every 8 hours until delivery.  Ensured BUN/sCr ordered at baseline and every 48 hours x at least 3 levels, then at least weekly.    Vancomycin Target Concentration Parameters  Treatment  Population Target AUC/THEA Target Trough   Invasive MRSA Infection (bacteremia, pneumonia, meningitis, endocarditis, osteomyelitis)  Sepsis (undifferentiated) 400-600 N/A   Infection due to non-MRSA pathogen  Empiric treatment of non-invasive MRSA infection  (SSTI, UTI) <500 10-15 mg/L   CrCl < 29 mL/min  Rapidly fluctuating serum creatinine   JULIUS N/A < 15 mg/L     Renal replacement therapy is dosed by levels, per hospital protocol.  Abbreviations  * Pauc: probability that AUC is >400 (efficacy); Pconc: probability that Ctrough is above 20 ?g/mL (toxicity); Tox: Probability of nephrotoxicity, based on Krish et al. Clin

## 2025-05-05 NOTE — FLOWSHEET NOTE
RN informs ASHIA Trinidad of pt leaking small amount of mec stained fluid on pad. RN swabbed pad - amnio swab positive. Ordered to start pitocin after 4 hours of cytotec. Can be off monitors at 1830 for light dinner and shower. Start pitocin around 1930 with vacomycin.

## 2025-05-06 LAB
BASOPHILS # BLD: 0.07 K/UL (ref 0–0.2)
BASOPHILS NFR BLD: 0 % (ref 0–2)
BILIRUB UR QL STRIP: NEGATIVE
CLARITY UR: CLEAR
COLOR UR: YELLOW
EOSINOPHIL # BLD: 0.06 K/UL (ref 0–0.44)
EOSINOPHILS RELATIVE PERCENT: 0 % (ref 1–4)
EPI CELLS #/AREA URNS HPF: NORMAL /HPF (ref 0–25)
ERYTHROCYTE [DISTWIDTH] IN BLOOD BY AUTOMATED COUNT: 14.1 % (ref 11.8–14.4)
GLUCOSE UR STRIP-MCNC: NEGATIVE MG/DL
HCT VFR BLD AUTO: 35.9 % (ref 36.3–47.1)
HGB BLD-MCNC: 12.2 G/DL (ref 11.9–15.1)
HGB UR QL STRIP.AUTO: ABNORMAL
IMM GRANULOCYTES # BLD AUTO: 0.23 K/UL (ref 0–0.3)
IMM GRANULOCYTES NFR BLD: 1 %
KETONES UR STRIP-MCNC: NEGATIVE MG/DL
LEUKOCYTE ESTERASE UR QL STRIP: NEGATIVE
LYMPHOCYTES NFR BLD: 1.36 K/UL (ref 1.1–3.7)
LYMPHOCYTES RELATIVE PERCENT: 7 % (ref 24–43)
MCH RBC QN AUTO: 31 PG (ref 25.2–33.5)
MCHC RBC AUTO-ENTMCNC: 34 G/DL (ref 28.4–34.8)
MCV RBC AUTO: 91.1 FL (ref 82.6–102.9)
MONOCYTES NFR BLD: 1.47 K/UL (ref 0.1–1.2)
MONOCYTES NFR BLD: 7 % (ref 3–12)
NEUTROPHILS NFR BLD: 85 % (ref 36–65)
NEUTS SEG NFR BLD: 17.44 K/UL (ref 1.5–8.1)
NITRITE UR QL STRIP: NEGATIVE
NRBC BLD-RTO: 0 PER 100 WBC
PH UR STRIP: 6 [PH] (ref 5–9)
PLATELET # BLD AUTO: 137 K/UL (ref 138–453)
PMV BLD AUTO: 10.2 FL (ref 8.1–13.5)
PROT UR STRIP-MCNC: NEGATIVE MG/DL
RBC # BLD AUTO: 3.94 M/UL (ref 3.95–5.11)
RBC #/AREA URNS HPF: NORMAL /HPF (ref 0–2)
SP GR UR STRIP: <1.005 (ref 1.01–1.02)
UROBILINOGEN UR STRIP-ACNC: NORMAL EU/DL (ref 0–1)
WBC #/AREA URNS HPF: NORMAL /HPF (ref 0–5)
WBC OTHER # BLD: 20.6 K/UL (ref 3.5–11.3)

## 2025-05-06 PROCEDURE — 81001 URINALYSIS AUTO W/SCOPE: CPT

## 2025-05-06 PROCEDURE — 51798 US URINE CAPACITY MEASURE: CPT

## 2025-05-06 PROCEDURE — 59409 OBSTETRICAL CARE: CPT | Performed by: ADVANCED PRACTICE MIDWIFE

## 2025-05-06 PROCEDURE — 3E033VJ INTRODUCTION OF OTHER HORMONE INTO PERIPHERAL VEIN, PERCUTANEOUS APPROACH: ICD-10-PCS | Performed by: NURSE PRACTITIONER

## 2025-05-06 PROCEDURE — 85025 COMPLETE CBC W/AUTO DIFF WBC: CPT

## 2025-05-06 PROCEDURE — 0UQGXZZ REPAIR VAGINA, EXTERNAL APPROACH: ICD-10-PCS | Performed by: NURSE PRACTITIONER

## 2025-05-06 PROCEDURE — 3E0P7VZ INTRODUCTION OF HORMONE INTO FEMALE REPRODUCTIVE, VIA NATURAL OR ARTIFICIAL OPENING: ICD-10-PCS | Performed by: NURSE PRACTITIONER

## 2025-05-06 PROCEDURE — 6360000002 HC RX W HCPCS: Performed by: ADVANCED PRACTICE MIDWIFE

## 2025-05-06 PROCEDURE — 51702 INSERT TEMP BLADDER CATH: CPT

## 2025-05-06 PROCEDURE — 6370000000 HC RX 637 (ALT 250 FOR IP): Performed by: ADVANCED PRACTICE MIDWIFE

## 2025-05-06 PROCEDURE — 1220000000 HC SEMI PRIVATE OB R&B

## 2025-05-06 PROCEDURE — 2580000003 HC RX 258: Performed by: ADVANCED PRACTICE MIDWIFE

## 2025-05-06 PROCEDURE — 7200000001 HC VAGINAL DELIVERY

## 2025-05-06 RX ORDER — HYDROCORTISONE 25 MG/G
CREAM TOPICAL 2 TIMES DAILY
Status: DISCONTINUED | OUTPATIENT
Start: 2025-05-06 | End: 2025-05-08 | Stop reason: HOSPADM

## 2025-05-06 RX ORDER — SODIUM CHLORIDE 0.9 % (FLUSH) 0.9 %
5-40 SYRINGE (ML) INJECTION EVERY 12 HOURS SCHEDULED
Status: DISCONTINUED | OUTPATIENT
Start: 2025-05-06 | End: 2025-05-08 | Stop reason: HOSPADM

## 2025-05-06 RX ORDER — CARBOPROST TROMETHAMINE 250 UG/ML
250 INJECTION, SOLUTION INTRAMUSCULAR PRN
Status: DISCONTINUED | OUTPATIENT
Start: 2025-05-06 | End: 2025-05-08 | Stop reason: HOSPADM

## 2025-05-06 RX ORDER — ACETAMINOPHEN 325 MG/1
650 TABLET ORAL EVERY 6 HOURS PRN
Status: DISCONTINUED | OUTPATIENT
Start: 2025-05-06 | End: 2025-05-08 | Stop reason: HOSPADM

## 2025-05-06 RX ORDER — ONDANSETRON 2 MG/ML
4 INJECTION INTRAMUSCULAR; INTRAVENOUS EVERY 6 HOURS PRN
Status: DISCONTINUED | OUTPATIENT
Start: 2025-05-06 | End: 2025-05-08 | Stop reason: HOSPADM

## 2025-05-06 RX ORDER — ONDANSETRON 4 MG/1
4 TABLET, ORALLY DISINTEGRATING ORAL EVERY 6 HOURS PRN
Status: DISCONTINUED | OUTPATIENT
Start: 2025-05-06 | End: 2025-05-08 | Stop reason: HOSPADM

## 2025-05-06 RX ORDER — IBUPROFEN 800 MG/1
800 TABLET, FILM COATED ORAL EVERY 8 HOURS PRN
Status: DISCONTINUED | OUTPATIENT
Start: 2025-05-06 | End: 2025-05-08 | Stop reason: HOSPADM

## 2025-05-06 RX ORDER — MISOPROSTOL 100 UG/1
800 TABLET ORAL PRN
Status: DISCONTINUED | OUTPATIENT
Start: 2025-05-06 | End: 2025-05-08 | Stop reason: HOSPADM

## 2025-05-06 RX ORDER — DOCUSATE SODIUM 100 MG/1
100 CAPSULE, LIQUID FILLED ORAL 2 TIMES DAILY PRN
Status: DISCONTINUED | OUTPATIENT
Start: 2025-05-06 | End: 2025-05-08 | Stop reason: HOSPADM

## 2025-05-06 RX ORDER — METHYLERGONOVINE MALEATE 0.2 MG/ML
200 INJECTION INTRAVENOUS PRN
Status: DISCONTINUED | OUTPATIENT
Start: 2025-05-06 | End: 2025-05-08 | Stop reason: HOSPADM

## 2025-05-06 RX ORDER — MODIFIED LANOLIN
OINTMENT (GRAM) TOPICAL PRN
Status: DISCONTINUED | OUTPATIENT
Start: 2025-05-06 | End: 2025-05-08 | Stop reason: HOSPADM

## 2025-05-06 RX ADMIN — SODIUM CHLORIDE, POTASSIUM CHLORIDE, SODIUM LACTATE AND CALCIUM CHLORIDE: 600; 310; 30; 20 INJECTION, SOLUTION INTRAVENOUS at 00:11

## 2025-05-06 RX ADMIN — BENZOCAINE AND LEVOMENTHOL: 200; 5 SPRAY TOPICAL at 02:53

## 2025-05-06 RX ADMIN — ACETAMINOPHEN 650 MG: 325 TABLET ORAL at 07:43

## 2025-05-06 RX ADMIN — IBUPROFEN 800 MG: 800 TABLET, FILM COATED ORAL at 11:42

## 2025-05-06 RX ADMIN — IBUPROFEN 800 MG: 800 TABLET, FILM COATED ORAL at 02:52

## 2025-05-06 RX ADMIN — Medication 999 ML/HR: at 00:45

## 2025-05-06 RX ADMIN — Medication 166 MILLI-UNITS/MIN: at 00:57

## 2025-05-06 RX ADMIN — METHYLERGONOVINE MALEATE 200 MCG: 0.2 INJECTION INTRAVENOUS at 01:03

## 2025-05-06 RX ADMIN — ACETAMINOPHEN 650 MG: 325 TABLET ORAL at 14:15

## 2025-05-06 RX ADMIN — IBUPROFEN 800 MG: 800 TABLET, FILM COATED ORAL at 19:53

## 2025-05-06 RX ADMIN — WITCH HAZEL: 500 SOLUTION RECTAL; TOPICAL at 02:53

## 2025-05-06 ASSESSMENT — PAIN DESCRIPTION - LOCATION
LOCATION: ABDOMEN
LOCATION: PERINEUM;ABDOMEN
LOCATION: PERINEUM;ABDOMEN
LOCATION: BACK
LOCATION: VAGINA;ABDOMEN

## 2025-05-06 ASSESSMENT — PAIN DESCRIPTION - DESCRIPTORS
DESCRIPTORS: CRAMPING
DESCRIPTORS: DISCOMFORT
DESCRIPTORS: ACHING;CRAMPING

## 2025-05-06 ASSESSMENT — PAIN SCALES - GENERAL
PAINLEVEL_OUTOF10: 2
PAINLEVEL_OUTOF10: 3
PAINLEVEL_OUTOF10: 3
PAINLEVEL_OUTOF10: 6

## 2025-05-06 ASSESSMENT — PAIN DESCRIPTION - ORIENTATION
ORIENTATION: LOWER;MID
ORIENTATION: LOWER

## 2025-05-06 ASSESSMENT — PAIN - FUNCTIONAL ASSESSMENT: PAIN_FUNCTIONAL_ASSESSMENT: ACTIVITIES ARE NOT PREVENTED

## 2025-05-06 NOTE — ANESTHESIA PROCEDURE NOTES
Epidural Block    Patient location during procedure: OB  Start time: 5/5/2025 9:33 PM  End time: 5/5/2025 9:40 PM  Reason for block: labor epidural  Staffing  Performed: resident/CRNA   Resident/CRNA: Jessica White APRN - CRNA  Performed by: Jessica White APRN - CRNA  Authorized by: Jessica White APRN - CRNA    Epidural  Patient position: sitting  Prep: ChloraPrep and site prepped and draped  Patient monitoring: continuous pulse ox and frequent blood pressure checks  Approach: midline  Location: L3-4  Injection technique: AJITH saline  Provider prep: mask and sterile gloves  Needle  Needle type: Tuohy   Needle gauge: 17 G  Needle length: 3.5 in  Needle insertion depth: 7 cm  Catheter type: side hole  Catheter size: 19 G  Catheter at skin depth: 13 cm  Test dose: negative (3 cc and then 2 cc)  Kit: b cartagena perifix fx  Lot number: [id number]  Expiration date: 1/31/2026Catheter Secured: tegaderm and tape  Assessment  Sensory level: T8  Hemodynamics: stable  Attempts: 1  Outcomes: uncomplicated and patient tolerated procedure well  Additional Notes  Test dose Time: 2140    Preanesthetic Checklist  Completed: patient identified, IV checked, site marked, risks and benefits discussed, surgical/procedural consents, equipment checked, pre-op evaluation, timeout performed, anesthesia consent given, oxygen available, monitors applied/VS acknowledged and blood product R/B/A discussed and consented

## 2025-05-06 NOTE — L&D DELIVERY NOTE
Gabby Goldberg [[id number]]      Labor Events     Labor: No   Steroids: None  Cervical Ripening Date/Time:      Cervical Ripening Type: Misoprostol  Rupture Identifier: Sac 1  Rupture Date/Time:  25 15:00:00   Rupture Type: SROM  Fluid Color: Meconium  Meconium Consistency: Moderate  Fluid Odor: None  Fluid Volume: Scant  Induction: Oxytocin  Augmentation: None       Anesthesia    Method: Epidural       Labor Event Times      Labor onset date/time:  25 15:00:00 EDT     Dilation complete date/time:  25 23:33:00 EDT     Start pushing date/time:  2025 00:17:00   Decision date/time (emergent ):            Labor Length    1st stage: 8h 33m  2nd stage: 1h 10m  3rd stage: 0h 06m       Delivery Details      Delivery Date: 25 Delivery Time: 00:43:00   Delivery Type: Vaginal, Spontaneous               Presentation    Presentation: Vertex  Position: Right  _: Occiput  _: Anterior       Shoulder Dystocia    Shoulder Dystocia Present?: No       Assisted Delivery Details    Forceps Attempted?: No  Vacuum Extractor Attempted?: No                           Cord    Vessels: 3 Vessels  Complications: None  Delayed Cord Clamping?: Yes  Cord Clamped Date/Time: 2025 00:48:03  Cord Blood Disposition: Lab  Gases Sent?: No   Cord Comments:  PROCEDURAL - OBSTETRIC - CORD OBSERVATION   Cord segment sent to lab for holding             Placenta    Date/Time: 2025 00:49:58  Removal: Spontaneous  Appearance: Intact  Disposition: Discarded       Lacerations    Perineal Lacerations: None  Other Lacerations: vaginal laceration  Vaginal Laceration?: Yes Repaired?: Yes   Number of Repair Packets: 1       Vaginal Counts    Initial Count Personnel: CAROLANN GARCIA RN  Initial Count Verified By: JEWEL NOE RN  Intial Sponge Count: Correct  Intial Instruments Count: Correct   Final Sponges Count: Correct Final Needles  Count: Correct Final Instruments Count: Correct   Final Count Personnel:

## 2025-05-06 NOTE — PROGRESS NOTES
This RN called and spoke with Dr. Pacheco to update him on patients current labor. Dr. Pacheco aware of mec stained fluid. No questions at this time

## 2025-05-06 NOTE — PROGRESS NOTES
Patient asking for pain interventions due to 8/10 pain. Massage gun given. Heat and Ice offered. Went over Nitrous Gas consent with patient. Patient not comfortable with side effects at this time. Went over Nubain with patient. Patient aware first dose works best. Patient stating she would like to try first dose to hold her over until Epidural.      This RN went over side effects with patient.   Patient received dose of Nubain. Pain score from 8/10 to 4/10. Patient able to rest at this time

## 2025-05-06 NOTE — PROGRESS NOTES
FELIPE Kahn CNM called and updated on fundal check, pt being unable to void with c/o pain, and patient developing a hematoma on her left side of the vaginal opening. Orders received at this time.

## 2025-05-06 NOTE — ANESTHESIA PRE PROCEDURE
Department of Anesthesiology  Preprocedure Note       Name:  Cammie Goldberg   Age:  34 y.o.  :  1990                                          MRN:  [id number]         Date:  2025      Surgeon: * No surgeons listed *    Procedure: * No procedures listed *    Medications prior to admission:   Prior to Admission medications    Medication Sig Start Date End Date Taking? Authorizing Provider   Prenatal MV-Min-Fe Fum-FA-DHA (PRENATAL 1 PO) Take by mouth   Yes Provider, MD Reza   [facility]. Devices (BREAST PUMP) [facility] [place] [facility] Medela double pump - plans breastfeeding 4/15/25   Anamaria Trinidad APRN - CNM   hydrocortisone (ANUSOL-HC) 2.5 % CREA rectal cream Apply twice daily. 25   Anamaria Trinidad APRN - CNM       Current medications:    Current Facility-Administered Medications   Medication Dose Route Frequency Provider Last Rate Last Admin   • oxytocin (PITOCIN) 30 units in 500 mL infusion  1-24 pratibha-units/min IntraVENous Continuous Anamaria Trinidad APRN - CNM 5 mL/hr at 25 5 pratibha-units/min at 25   • lactated ringers infusion   IntraVENous Continuous Anamaria Trinidad APRN -  mL/hr at 25 Rate Change at 25   • lactated ringers bolus 500 mL  500 mL IntraVENous PRN Anamaria Trinidad APRN - CNM        Or   • lactated ringers bolus 1,000 mL  1,000 mL IntraVENous PRN Anamaria Trinidad APRN - CNM       • sodium chloride flush 0.9 % injection 5-40 mL  5-40 mL IntraVENous 2 times per day Anamaria Trinidad APRN - CNM       • lidocaine PF 1 % injection 30 mL  30 mL Other PRN Anamaria Trinidad APRN - CNM       • nalbuphine (NUBAIN) injection 10 mg  10 mg IntraVENous Q2H PRN Anamaria Trinidad APRN - CNM   10 mg at 25   • butorphanol (STADOL) injection 1 mg  1 mg IntraVENous Q3H PRN Anamaria Trinidad APRN - CNM       • nitrous oxide 50% inhalation 1 each  1 each Inhalation Continuous PRN Anamaria Trinidad

## 2025-05-06 NOTE — PROGRESS NOTES
ASHIA Trinidad CNM and FELIPE Kahn CNM in unit at this time. Malini DAVIS updated on retained urine and order for catheter to be placed from EFLIPE Kahn CNM along with update on hematoma forming to patients left side of vaginal opening. Orders received from Malini DAVIS for CBC to be done now.

## 2025-05-06 NOTE — ANESTHESIA POSTPROCEDURE EVALUATION
Department of Anesthesiology  Postprocedure Note    Patient: Cammie Goldberg  MRN: [id number]  YOB: 1990  Date of evaluation: 5/6/2025    Procedure Summary       Date: 05/05/25 Room / Location:     Anesthesia Start: 2112 Anesthesia Stop: 05/06/25 0043    Procedure: Labor Analgesia Diagnosis:     Scheduled Providers:  Responsible Provider: Jessica White APRN - CRNA    Anesthesia Type: epidural ASA Status: 2            Anesthesia Type: No value filed.    David Phase I:      David Phase II:      Anesthesia Post Evaluation    Patient location during evaluation: bedside  Patient participation: complete - patient participated  Level of consciousness: awake and alert  Pain score: 0  Airway patency: patent  Nausea & Vomiting: no nausea and no vomiting  Cardiovascular status: hemodynamically stable  Respiratory status: acceptable  Hydration status: stable  Pain management: adequate        No notable events documented.

## 2025-05-06 NOTE — PROGRESS NOTES
Pt calls RN to room stating she is unsure if she is emptying her bladder completely or not. Urine hat placed in toilet at this time and patient instructed to void in hat to assess urine output. Pt verbalize understanding.

## 2025-05-06 NOTE — PROGRESS NOTES
2110 - This RN made Jessica POSEY aware and ELDON Trinidad CNM of patients request for epidural.    2116 - Jessica POSEY in room     2122 - Epidural Consent complete     2130 - patient sitting up for epidural; Time out complete    This RN at bedside attempting to trace fetus through contractions. Monitor picking up maternal heart rate.     2133- Procedure start     2140 - Test Dose     HR -93-95     No concerns at this time

## 2025-05-06 NOTE — LACTATION NOTE
Pt states that infant has latched without issues so far. Denies questions or concerns.    Lactation education:    [x] Latch/ good latch vs shallow latch/ steps to obtaining deep latch    [x] How to know if infant is eating enough/ feedings per 24 hours, wet/dirty diapers    [x] Feeding/satiety cues      Lactation education resources given:     [x]  How to Breastfeed your baby - [facility] publication      [x]  Follow up support information    [x]  Breast milk storage guidelines - CDC    [x]  Breastpump cleaning guidelines - CDC     [x]  Breastfeeding & Safe Sleep handout - [facility] publication    Explained to patient, patient verbalizes understanding.        Signed:  Amie Mccain RN, BSN, IBCLC

## 2025-05-06 NOTE — PROGRESS NOTES
Pt calls RN to bedside at this time stating she has not been able to void since urine hat was placed in toilet. RN assesses fundus at this time and bladder visibly distended and fundus firm at U+2 and off to patients right side. Bladder scan performed and greater than 999ml obtained. RN to notify CNM.

## 2025-05-07 LAB
BASOPHILS # BLD: 0.09 K/UL (ref 0–0.2)
BASOPHILS NFR BLD: 1 % (ref 0–2)
EOSINOPHIL # BLD: 0.31 K/UL (ref 0–0.44)
EOSINOPHILS RELATIVE PERCENT: 2 % (ref 1–4)
ERYTHROCYTE [DISTWIDTH] IN BLOOD BY AUTOMATED COUNT: 14.4 % (ref 11.8–14.4)
HCT VFR BLD AUTO: 31.1 % (ref 36.3–47.1)
HGB BLD-MCNC: 10.8 G/DL (ref 11.9–15.1)
IMM GRANULOCYTES # BLD AUTO: 0.16 K/UL (ref 0–0.3)
IMM GRANULOCYTES NFR BLD: 1 %
LYMPHOCYTES NFR BLD: 1.68 K/UL (ref 1.1–3.7)
LYMPHOCYTES RELATIVE PERCENT: 10 % (ref 24–43)
MCH RBC QN AUTO: 31.5 PG (ref 25.2–33.5)
MCHC RBC AUTO-ENTMCNC: 34.7 G/DL (ref 28.4–34.8)
MCV RBC AUTO: 90.7 FL (ref 82.6–102.9)
MONOCYTES NFR BLD: 1.29 K/UL (ref 0.1–1.2)
MONOCYTES NFR BLD: 7 % (ref 3–12)
NEUTROPHILS NFR BLD: 79 % (ref 36–65)
NEUTS SEG NFR BLD: 13.97 K/UL (ref 1.5–8.1)
NRBC BLD-RTO: 0 PER 100 WBC
PLATELET # BLD AUTO: 119 K/UL (ref 138–453)
PMV BLD AUTO: 9.9 FL (ref 8.1–13.5)
RBC # BLD AUTO: 3.43 M/UL (ref 3.95–5.11)
WBC OTHER # BLD: 17.5 K/UL (ref 3.5–11.3)

## 2025-05-07 PROCEDURE — 1220000000 HC SEMI PRIVATE OB R&B

## 2025-05-07 PROCEDURE — 36415 COLL VENOUS BLD VENIPUNCTURE: CPT

## 2025-05-07 PROCEDURE — 51702 INSERT TEMP BLADDER CATH: CPT

## 2025-05-07 PROCEDURE — 99024 POSTOP FOLLOW-UP VISIT: CPT | Performed by: ADVANCED PRACTICE MIDWIFE

## 2025-05-07 PROCEDURE — 6370000000 HC RX 637 (ALT 250 FOR IP): Performed by: ADVANCED PRACTICE MIDWIFE

## 2025-05-07 PROCEDURE — 51798 US URINE CAPACITY MEASURE: CPT

## 2025-05-07 PROCEDURE — 85025 COMPLETE CBC W/AUTO DIFF WBC: CPT

## 2025-05-07 RX ORDER — CEPHALEXIN 500 MG/1
500 CAPSULE ORAL EVERY 8 HOURS SCHEDULED
Status: DISCONTINUED | OUTPATIENT
Start: 2025-05-07 | End: 2025-05-08 | Stop reason: HOSPADM

## 2025-05-07 RX ADMIN — IBUPROFEN 800 MG: 800 TABLET, FILM COATED ORAL at 04:37

## 2025-05-07 RX ADMIN — CEPHALEXIN 500 MG: 500 CAPSULE ORAL at 22:02

## 2025-05-07 ASSESSMENT — PAIN DESCRIPTION - LOCATION: LOCATION: VAGINA

## 2025-05-07 ASSESSMENT — PAIN SCALES - GENERAL: PAINLEVEL_OUTOF10: 3

## 2025-05-07 NOTE — PROGRESS NOTES
Wisdom catheter removed at this time per verbal order from Anamaria Trinidad, 10 mL removed from balloon and wisdom was removed without difficult. Patient tolerated well. Care ongoing.

## 2025-05-07 NOTE — PROGRESS NOTES
Writer spoke with Anamaria Trinidad at this time about bladder scan results, per Anamaria, she wants the patient to try to urinate every hour for the next 2 hours and then bladder scan her again between [id number]. Patient updated and verbalizes understanding.

## 2025-05-07 NOTE — PROGRESS NOTES
Department of Obstetrics and Gynecology  Labor and Delivery       Post Partum Progress Note             SUBJECTIVE:  1st day postpartum s/p , did well but ; delivery day patient had a significant episode of urinary retention postpartum.  She was noted to have significant vulvar edema and a golf ball sized perineal hematoma as well as large hemorrhoids.  Patient had wisdom placed yesterday from approximately 1700 to 2240, patient again could not urinate and large amount noted in bladder scan (700+)  wisdom reinserted at 0400 today.  Patient now would like to get up and move more and remove wisdom and will make attempts to urinate more frequently.    OBJECTIVE:      Vitals:  /62   Pulse 68   Temp 98 °F (36.7 °C) (Temporal)   Resp 16   LMP 07/15/2024   SpO2 99%   Breastfeeding Unknown   Patient Vitals for the past 24 hrs:   BP Temp Temp src Pulse Resp SpO2   25 0833 103/62 98 °F (36.7 °C) Temporal 68 16 --   25 2148 100/64 97.7 °F (36.5 °C) Oral 73 16 --   25 1557 (!) 104/57 98 °F (36.7 °C) Oral 75 16 99 %   25 1205 118/65 97.7 °F (36.5 °C) Oral 99 16 98 %         ABDOMEN: normal shape, position and consistency  GENITAL/URINARY:  External Genitalia:  soft bilateral labial edema, golf ball sized perineal hematoma and large hemorrhoids  Uterus:  Size normal  Breast:normal appearance, no masses or tenderness  Cor: RRR no Murmurs  Pulmonary: clear to auscultation anterior and posterior  Extremities: no Clubbing cyanosis or ecchymosis    DATA:    CBC with Differential:    Lab Results   Component Value Date/Time    WBC 17.5 2025 08:17 AM    RBC 3.43 2025 08:17 AM    HGB 10.8 2025 08:17 AM    HCT 31.1 2025 08:17 AM     2025 08:17 AM    MCV 90.7 2025 08:17 AM    MCH 31.5 2025 08:17 AM    MCHC 34.7 2025 08:17 AM    RDW 14.4 2025 08:17 AM    LYMPHOPCT 10 2025 08:17 AM    MONOPCT 7 2025 08:17 AM    EOSPCT 2 2025

## 2025-05-08 VITALS
TEMPERATURE: 98 F | OXYGEN SATURATION: 98 % | SYSTOLIC BLOOD PRESSURE: 95 MMHG | RESPIRATION RATE: 16 BRPM | DIASTOLIC BLOOD PRESSURE: 54 MMHG | HEART RATE: 91 BPM

## 2025-05-08 PROCEDURE — 6370000000 HC RX 637 (ALT 250 FOR IP): Performed by: ADVANCED PRACTICE MIDWIFE

## 2025-05-08 RX ORDER — ACETAMINOPHEN 325 MG/1
650 TABLET ORAL EVERY 6 HOURS PRN
Qty: 120 TABLET | Refills: 3 | Status: SHIPPED | OUTPATIENT
Start: 2025-05-08

## 2025-05-08 RX ORDER — IBUPROFEN 800 MG/1
800 TABLET, FILM COATED ORAL EVERY 8 HOURS PRN
Qty: 120 TABLET | Refills: 3 | Status: SHIPPED | OUTPATIENT
Start: 2025-05-08

## 2025-05-08 RX ORDER — PSEUDOEPHEDRINE HCL 30 MG
100 TABLET ORAL 2 TIMES DAILY PRN
Qty: 30 CAPSULE | Refills: 1 | Status: SHIPPED | OUTPATIENT
Start: 2025-05-08

## 2025-05-08 RX ORDER — CEPHALEXIN 500 MG/1
500 CAPSULE ORAL 2 TIMES DAILY
Qty: 14 CAPSULE | Refills: 0 | Status: SHIPPED | OUTPATIENT
Start: 2025-05-08

## 2025-05-08 RX ADMIN — CEPHALEXIN 500 MG: 500 CAPSULE ORAL at 06:13

## 2025-05-08 RX ADMIN — IBUPROFEN 800 MG: 800 TABLET, FILM COATED ORAL at 08:28

## 2025-05-08 RX ADMIN — HYDROCORTISONE: 25 CREAM TOPICAL at 09:09

## 2025-05-08 ASSESSMENT — PAIN SCALES - GENERAL: PAINLEVEL_OUTOF10: 2

## 2025-05-08 ASSESSMENT — PAIN DESCRIPTION - LOCATION: LOCATION: ABDOMEN

## 2025-05-08 ASSESSMENT — PAIN DESCRIPTION - DESCRIPTORS: DESCRIPTORS: CRAMPING

## 2025-05-08 ASSESSMENT — PAIN DESCRIPTION - ORIENTATION: ORIENTATION: LOWER

## 2025-05-08 NOTE — PROGRESS NOTES
Writer spoke to Anamaria Trinidad regarding bladder scan of 752. Orders to start Keflex 500mg TID and to place wisdom catheter.

## 2025-05-08 NOTE — PLAN OF CARE
Problem: Pain  Goal: Verbalizes/displays adequate comfort level or baseline comfort level  2025 by Heena Fish RN  Outcome: Completed  2025 by Heena Fish RN  Outcome: Progressing     Problem: Vaginal Birth or  Section  Goal: Fetal and maternal status remain reassuring during the birth process  Description:  Birth OB-Pregnancy care plan goal which identifies if the fetal and maternal status remain reassuring during the birth process  2025 by Heena Fish RN  Outcome: Completed  2025 by Heena Fish RN  Outcome: Progressing     Problem: Postpartum  Goal: Experiences normal postpartum course  Description:  Postpartum OB-Pregnancy care plan goal which identifies if the mother is experiencing a normal postpartum course  2025 by Heena Fish RN  Outcome: Completed  2025 by Heena Fish RN  Outcome: Progressing  Goal: Appropriate maternal -  bonding  Description:  Postpartum OB-Pregnancy care plan goal which identifies if the mother and  are bonding appropriately  2025 by Heena Fish RN  Outcome: Completed  2025 by Heena Fish RN  Outcome: Progressing  Goal: Establishment of infant feeding pattern  Description:  Postpartum OB-Pregnancy care plan goal which identifies if the mother is establishing a feeding pattern with their   2025 by Heena Fish RN  Outcome: Completed  2025 by Heena Fish RN  Outcome: Progressing  Goal: Incisions, wounds, or drain sites healing without S/S of infection  2025 by Heena Fish RN  Outcome: Completed  2025 by Heena Fish RN  Outcome: Progressing     Problem: Infection - Adult  Goal: Absence of infection at discharge  2025 by Heena Fish RN  Outcome: Completed  2025 by Heena Fish RN  Outcome: Progressing  Goal: Absence of infection

## 2025-05-08 NOTE — DISCHARGE INSTRUCTIONS
Follow up with your OB provider as specified.     [facility] OB Department Phone: [phone]    DO Ani Sharif, SUSAN Trinidad, SUSAN Guerrero, SUSAN Gabriel [address]  [address]  [place], [place] [address]  [place] Office: [phone]  [place] Office: [phone]        Physical Changes   “Afterbirth” pains are cramps that occur in your lower abdomen; these occur due to the uterus shrinking back to its pre-pregnancy size.  Will likely be stronger during breastfeeding.  Usually starts to go away after the 1st week postpartum.  Empty your bladder often and frequently to reduce abdominal cramps.  This allows room for your uterus to shrink back to normal.  If the bladder remains full it can cause increased bleeding.  Kegel Exercises will help restore bladder control.  The 1st bowel movement may take 2-3 days.  To avoid constipation, add a mild stool softener.  To assist with bowel movement, try putting your feet on a stool, rest your elbows on your knees, and take deep breaths.  Hemorrhoids may develop.  If they do, avoid straining, use pre-moistened wipes, and apply ice packs and/or witch hazel pads.   Eat a well-balanced diet focusing on foods high in fiber and protein.  Drink 6-8 glasses of water a day.  Swelling is common but should subside in time.   Keep your legs elevated when possible.  Wear stockings or socks; make sure they are not too tight.  Hair loss may occur.  Your hair goes through a resting phase.  Therefore, you lose less during pregnancy.  Losing hair in large amounts is not unusual for the first 5 months after birth.    Perineal Care/Bleeding  Use the malissa-bottle after toileting until the bleeding stops.  Always cleanse from front to back.   If a tear occurred, stitches would dissolve in 4-6 weeks.  Use Epsom salts/sitz baths to alleviate any pain, burning, or itching.  Vaginal Bleeding will decrease in amount over the next few weeks.   First 1-3 days = Bright

## 2025-05-08 NOTE — FLOWSHEET NOTE
Patient off unit in stable condition.    Departure Mode: with significant other.    Mobility at Departure: wheelchair    Discharged to: private residence    Time of Discharge: 1236

## 2025-05-08 NOTE — PROGRESS NOTES
Subjective:       34 y.o.   @ 41w3d    Postpartum Day 2: Vaginal Delivery on 2025    The patient feels well. The patient denies emotional concerns. Pain is well controlled with current medications.The patient is ambulating well. The patient is tolerating a normal diet. Pt continues to have urinary retention    Objective:      Patient Vitals for the past 8 hrs:   BP Temp Temp src Pulse Resp SpO2   25 0742 (!) 95/54 98 °F (36.7 °C) Oral 91 16 98 %     General:    alert, appears stated age, and cooperative   Bowel Sounds:  active   Lochia:  appropriate   Uterine Fundus:   Firm @ U-2   Perineum:  Intact   DVT Evaluation:  No evidence of DVT seen on physical exam.         Assessment:     Status post Vaginal Delivery.       Plan:     1.Routine postpartum care.  2. Discharge Planning initiated  3. Robbins leg bag and Keflex   4. F/U with office on Tuesday    Joseph Marie DO, MBA, FACOOG  May 8, 2025

## 2025-05-08 NOTE — DISCHARGE SUMMARY
Obstetric Discharge Summary    Reasons for Admission on 2025  9:49 AM  Induction of Labor    Prenatal Procedures  None    Intrapartum Procedures  Vaginal Delivery    Postpartum Procedures  Robbins leg bag due to urinary retention     Data  Information for the patient's :  Gabby Goldberg [[id number]]   female   Birth Weight: 3.555 kg (7 lb 13.4 oz)  Discharge With Mother  Complications: No    Discharge Diagnosis  Patient Active Problem List    Diagnosis Date Noted    Normal delivery 2025    Third trimester pregnancy 2025    41 weeks gestation of pregnancy 2025    Premature rupture of membranes 2025    Decreased fetal movement affecting management of mother, antepartum 2025       Discharge Information  Current Discharge Medication List        START taking these medications    Details   acetaminophen (TYLENOL) 325 MG tablet Take 2 tablets by mouth every 6 hours as needed for Pain  Qty: 120 tablet, Refills: 3      ibuprofen (ADVIL;MOTRIN) 800 MG tablet Take 1 tablet by mouth every 8 hours as needed for Pain  Qty: 120 tablet, Refills: 3      docusate sodium (COLACE, DULCOLAX) 100 MG CAPS Take 100 mg by mouth 2 times daily as needed for Constipation  Qty: 30 capsule, Refills: 1      cephALEXin (KEFLEX) 500 MG capsule Take 1 capsule by mouth 2 times daily  Qty: 14 capsule, Refills: 0           CONTINUE these medications which have NOT CHANGED    Details   Prenatal MV-Min-Fe Fum-FA-DHA (PRENATAL 1 PO) Take by mouth      Misc. Devices (BREAST PUMP) MISC Medela double pump - plans breastfeeding  Qty: 1 each, Refills: 0    Associated Diagnoses: Lactation disorder      hydrocortisone (ANUSOL-HC) 2.5 % CREA rectal cream Apply twice daily.  Qty: 1 each, Refills: 0               Discharge to: Home with leg bag     Condition on discharge: Stable    Discharge Date: May 8, 2025      Joseph Marie DO, MBA, FACOOG, FACOG  May 8, 2025 11:14 AM

## 2025-05-12 ENCOUNTER — POSTPARTUM VISIT (OUTPATIENT)
Dept: OBGYN | Age: 35
End: 2025-05-12
Payer: MEDICAID

## 2025-05-12 VITALS
BODY MASS INDEX: 25.77 KG/M2 | WEIGHT: 174 LBS | HEIGHT: 69 IN | DIASTOLIC BLOOD PRESSURE: 72 MMHG | SYSTOLIC BLOOD PRESSURE: 118 MMHG

## 2025-05-12 DIAGNOSIS — R33.9 URINARY RETENTION: Primary | ICD-10-CM

## 2025-05-12 ASSESSMENT — ENCOUNTER SYMPTOMS
ABDOMINAL PAIN: 0
CONSTIPATION: 0
COUGH: 0
ABDOMINAL DISTENTION: 0
WHEEZING: 0
VOMITING: 0
NAUSEA: 0
COLOR CHANGE: 0
SHORTNESS OF BREATH: 0
CHEST TIGHTNESS: 0
DIARRHEA: 0

## 2025-05-12 NOTE — PROGRESS NOTES
Chief Complaint   Patient presents with    Postpartum Care     Pt presents today for 1 wk ppv check and cath removal. No concerns at this time.            SUBJECTIVE:  Cammie Goldberg  is a 34 y.o. female  who arrives for 1 week postpartum visit for wisdom removal.     Delivery Date: 25  Mode of Delivery:     The patient was seen, she denies any complaints.   Her Female infant is healthy.  She is breast feeding.    She is breastfeeding without difficulty.  She has some mild tenderness in her bottom.   She reports her lochia is moderate lochia  She does not report a mood disorder.      Review of Systems   Constitutional:  Negative for activity change, chills, diaphoresis, fatigue and fever.   HENT:  Negative for congestion.    Respiratory:  Negative for cough, chest tightness, shortness of breath and wheezing.    Cardiovascular:  Negative for chest pain, palpitations and leg swelling.   Gastrointestinal:  Negative for abdominal distention, abdominal pain, constipation, diarrhea, nausea and vomiting.   Genitourinary:  Negative for dysuria, frequency, hematuria and urgency.        Tenderness in bottom   Musculoskeletal:  Negative for arthralgias.   Skin:  Negative for color change.   Neurological:  Negative for dizziness, speech difficulty, weakness, light-headedness, numbness and headaches.   Psychiatric/Behavioral:  Negative for agitation, behavioral problems, confusion, dysphoric mood, self-injury and suicidal ideas. The patient is not nervous/anxious.          OBJECTIVE:  Vitals:    25 1023   BP: 118/72         Constitutional: well-developed, well-nourished, no distress  Cardiovascular: normal rate, regular rhythm, heart sounds normal  Pulmonary: effort normal, breath sounds normal, no respiratory distress  Abdominal Exam: soft non-tender. Bowel sounds present.. No guarding, rebound or rigidity. No costal vertebral angle tenderness bilateral. No hernias  Extremities: No edema or calf pain

## 2025-05-20 ENCOUNTER — TELEPHONE (OUTPATIENT)
Dept: OBGYN | Age: 35
End: 2025-05-20

## 2025-05-20 NOTE — TELEPHONE ENCOUNTER
Attempted to contact patient for 2 week post-partum follow up, however was unable to reach the patient. I did leave a voicemail for the patient with my name and phone number asking that the patient return my call so this can be completed.

## 2025-05-28 ENCOUNTER — TELEPHONE (OUTPATIENT)
Dept: OBGYN | Age: 35
End: 2025-05-28

## 2025-06-02 ENCOUNTER — TELEPHONE (OUTPATIENT)
Dept: OBGYN | Age: 35
End: 2025-06-02

## 2025-06-04 ENCOUNTER — LACTATION ENCOUNTER (OUTPATIENT)
Dept: LABOR AND DELIVERY | Age: 35
End: 2025-06-04

## 2025-06-04 NOTE — LACTATION NOTE
This note was copied from a baby's chart.  Date of office visit 2025    Infant's Name  Makayla Armenta   2025    Mother's Name Extended Emergency Contact Information  Primary Emergency Contact: JR ARMENTANDAISHA LATIF  Address: [address] [place] [address] [place]  Home Phone: [phone]  Mobile Phone: [phone]  Relation: Mother  Secondary Emergency Contact: Shola Armenta  Home Phone: [phone]  Mobile Phone: [phone]  Relation: Father phone [phone]    Mother's Provider S Amos Infant Provider Debra Davis at [facility]    : 1  Para: 1  Gest Age @ Delivery: Gestational Age: 41w3d  Type of Delivery: vaginal    Pregnancy/Delivery Complications: induced for post dates - antibiotics for UTI, epidural, retained urine post-delivery, had a catheter - discharged with leg bag - has tried Keflex postpartum    Significant Maternal Health History: gluten intolerance    Maternal Medications: Ibuprofen prn for pain, calcium, probiotic, magnesium. Unsure of strength of supplements. Multivitamin.    Infant Birth Wt: Birth Weight: 3.555 kg (7 lb 13.4 oz)        Present Age: 4 wk.o.     Reason for Visit: slow weight gain    Breast Assessment:  Breast Appearance/size:  [] S/IGT [x] Average    [] Lg    [x] Soft  [x] Full - occas  [] Engorged  Past surgery/scars neg  Supply: [] Low   [] Normal  [] Oversupply  Nipples:  [] Flat   [] Inverted   [] Invert w/ compression   [x] Protrude  Trauma: [x] None [] Bruise [] Wound    Pain: notices munchy pressure when infant nurses  Pumping: Pumps with zomee z 2 with 21mm flanges, 15-20 minutes. Output varies between 1-5 ounces. Pumps 2-3 times daily, AM. Afternoon, PM.    Infant Exam:  General: Well cared for appearance    Behavior: Alert   [x] Active [] Quiet  [place]: Soft, Flat    Mucous Membranes: Moist Noted thrush in right cheek - has been treated, continuing Nystatin drops  Skin: Clear Slight heat rash -

## 2025-06-16 ENCOUNTER — POSTPARTUM VISIT (OUTPATIENT)
Dept: OBGYN | Age: 35
End: 2025-06-16
Payer: MEDICAID

## 2025-06-16 VITALS
HEIGHT: 69 IN | WEIGHT: 162 LBS | DIASTOLIC BLOOD PRESSURE: 60 MMHG | BODY MASS INDEX: 23.99 KG/M2 | SYSTOLIC BLOOD PRESSURE: 115 MMHG

## 2025-06-16 DIAGNOSIS — N92.6 IRREGULAR MENSES: ICD-10-CM

## 2025-06-16 LAB — HGB, POC: 12.5 G/DL

## 2025-06-16 RX ORDER — ACETAMINOPHEN AND CODEINE PHOSPHATE 120; 12 MG/5ML; MG/5ML
1 SOLUTION ORAL DAILY
Qty: 28 TABLET | Refills: 6 | Status: SHIPPED | OUTPATIENT
Start: 2025-06-16

## 2025-06-16 NOTE — PROGRESS NOTES
POSTPARTUM EXAM    Date of service: 2025    Cammie Goldberg  Is a 34 y.o.  female    PT's PCP is: Philip Baig MD     : 1990     OB History    Para Term  AB Living   1 1 1   1   SAB IAB Ectopic Molar Multiple Live Births       0 1      # Outcome Date GA Lbr Akhil/2nd Weight Sex Type Anes PTL Lv   1 Term 25 41w3d 08:33 / 01:10 3.555 kg (7 lb 13.4 oz) F Vag-Spont EPI N DANYELLE        Social History     Tobacco Use   Smoking Status Never   Smokeless Tobacco Never         Social History     Substance and Sexual Activity   Alcohol Use Never         Delivery date 2025    Type of delivery:  Spontaneous vaginal delivery    Laceration:yes     Infant gender: female    Infant name: Makayal    Are you breast or bottle feeding?  Breast    Have you been sexually active since delivery? Yes    Vital Signs: Blood pressure 115/60, height 1.753 m (5' 9\"), weight 73.5 kg (162 lb), last menstrual period 07/15/2024, currently breastfeeding.     Labs:    Blood Type and Rh: O POSITIVE          Allergies: Amoxicillin, Codeine, Corn-containing products, Gluten meal, Hepatitis b virus vaccines, and Penicillins      Current Outpatient Medications:     Misc. Devices (BREAST PUMP) MISC, Medela double pump - plans breastfeeding, Disp: 1 each, Rfl: 0    Prenatal MV-Min-Fe Fum-FA-DHA (PRENATAL 1 PO), Take by mouth, Disp: , Rfl:     acetaminophen (TYLENOL) 325 MG tablet, Take 2 tablets by mouth every 6 hours as needed for Pain (Patient not taking: Reported on 2025), Disp: 120 tablet, Rfl: 3    ibuprofen (ADVIL;MOTRIN) 800 MG tablet, Take 1 tablet by mouth every 8 hours as needed for Pain (Patient not taking: Reported on 2025), Disp: 120 tablet, Rfl: 3    docusate sodium (COLACE, DULCOLAX) 100 MG CAPS, Take 100 mg by mouth 2 times daily as needed for Constipation (Patient not taking: Reported on 2025), Disp: 30 capsule, Rfl: 1    cephALEXin (KEFLEX) 500 MG capsule, Take 1 capsule by

## 2025-07-01 ENCOUNTER — LACTATION ENCOUNTER (OUTPATIENT)
Dept: LABOR AND DELIVERY | Age: 35
End: 2025-07-01

## 2025-07-01 NOTE — LACTATION NOTE
This note was copied from a baby's chart.  Date of office visit 2025    Infant's Name  Makayla Armenta   2025    Mother's Name Extended Emergency Contact Information  Primary Emergency Contact: JR ARMENTANDRA BHAVYA  Address: [address] [place] [address] [facility]  Home Phone: [phone]  Mobile Phone: [phone]  Relation: Mother  Secondary Emergency Contact: Shola Armenta  Home Phone: [phone]  Mobile Phone: [phone]  Relation: Father phone [phone]    Mother's Provider S Trinidad      [facility]     : 1  Para: 1  Gest Age @ Delivery: Gestational Age: 41w3d  Type of Delivery: vaginal     Pregnancy/Delivery Complications: induced for post dates - antibiotics for UTI, epidural, retained urine post-delivery, had a catheter - discharged with leg bag - has tried Keflex postpartum     Significant Maternal Health History: gluten intolerance     Maternal Medications: Ibuprofen prn for pain, calcium, probiotic, magnesium. Unsure of strength of supplements. Multivitamin.    Infant Birth Wt: Birth Weight: 3.555 kg (7 lb 13.4 oz)        Present Age: 8 wk.o.     Reason for Visit: follow up/ currently not breastfeeding    Breast Assessment:  Breast Appearance/size:  [] S/IGT [x] Average    [] Lg    [x] Soft  [] Full  [] Engorged  Past surgery/scars neg  Supply: [x] Low   [] Normal  [] Oversupply  Nipples:  [] Flat   [] Inverted   [] Invert w/ compression   [x] Protrude  Trauma: [x] None [] Bruise [] Wound    Pain:    Pumping: has not pumped since yesterday. Had previously been pumping twice daily, expressing approx 1 ounce each time.    Infant Exam:  General: Well cared for appearance    Behavior: Alert   [] Active [x] Quiet  [place]: Soft, Flat    Mucous Membranes: Moist    Skin: Clear Noted cradle cap  ENT: WNL    Mouth:  Upper lip: prominent frenum, center of upper lip pulls downward when lip extended.

## 2025-08-21 ENCOUNTER — TELEPHONE (OUTPATIENT)
Dept: OBGYN | Age: 35
End: 2025-08-21

## 2025-08-21 ENCOUNTER — HOSPITAL ENCOUNTER (OUTPATIENT)
Dept: LAB | Age: 35
Discharge: HOME | End: 2025-08-21
Payer: MEDICAID

## 2025-08-21 DIAGNOSIS — N91.2 AMENORRHEA: Primary | ICD-10-CM

## 2025-08-21 DIAGNOSIS — N91.2 AMENORRHEA: ICD-10-CM

## 2025-08-21 LAB — B-HCG SERPL EIA 3RD IS-ACNC: ABNORMAL MIU/ML (ref 0–7)

## 2025-08-21 PROCEDURE — 36415 COLL VENOUS BLD VENIPUNCTURE: CPT

## 2025-08-21 PROCEDURE — 84702 CHORIONIC GONADOTROPIN TEST: CPT

## 2025-08-26 ENCOUNTER — CLINICAL DOCUMENTATION ONLY (OUTPATIENT)
Dept: LABOR AND DELIVERY | Age: 35
End: 2025-08-26